# Patient Record
Sex: FEMALE | Race: WHITE | HISPANIC OR LATINO | Employment: FULL TIME | URBAN - METROPOLITAN AREA
[De-identification: names, ages, dates, MRNs, and addresses within clinical notes are randomized per-mention and may not be internally consistent; named-entity substitution may affect disease eponyms.]

---

## 2017-01-04 ENCOUNTER — ALLSCRIPTS OFFICE VISIT (OUTPATIENT)
Dept: OTHER | Facility: OTHER | Age: 32
End: 2017-01-04

## 2017-01-23 ENCOUNTER — GENERIC CONVERSION - ENCOUNTER (OUTPATIENT)
Dept: OTHER | Facility: OTHER | Age: 32
End: 2017-01-23

## 2017-01-23 ENCOUNTER — APPOINTMENT (OUTPATIENT)
Dept: LAB | Facility: HOSPITAL | Age: 32
End: 2017-01-23
Attending: OBSTETRICS & GYNECOLOGY
Payer: COMMERCIAL

## 2017-01-23 DIAGNOSIS — N91.2 AMENORRHEA: ICD-10-CM

## 2017-01-23 LAB — B-HCG SERPL-ACNC: <2 MIU/ML

## 2017-01-23 PROCEDURE — 84702 CHORIONIC GONADOTROPIN TEST: CPT

## 2017-01-23 PROCEDURE — 36415 COLL VENOUS BLD VENIPUNCTURE: CPT

## 2017-01-24 ENCOUNTER — GENERIC CONVERSION - ENCOUNTER (OUTPATIENT)
Dept: OTHER | Facility: OTHER | Age: 32
End: 2017-01-24

## 2017-05-25 ENCOUNTER — ALLSCRIPTS OFFICE VISIT (OUTPATIENT)
Dept: OTHER | Facility: OTHER | Age: 32
End: 2017-05-25

## 2017-09-27 ENCOUNTER — ALLSCRIPTS OFFICE VISIT (OUTPATIENT)
Dept: OTHER | Facility: OTHER | Age: 32
End: 2017-09-27

## 2017-11-29 ENCOUNTER — ALLSCRIPTS OFFICE VISIT (OUTPATIENT)
Dept: OTHER | Facility: OTHER | Age: 32
End: 2017-11-29

## 2017-11-29 DIAGNOSIS — K58.9 IRRITABLE BOWEL SYNDROME WITHOUT DIARRHEA: ICD-10-CM

## 2017-11-29 DIAGNOSIS — Z13.6 ENCOUNTER FOR SCREENING FOR CARDIOVASCULAR DISORDERS: ICD-10-CM

## 2017-11-29 DIAGNOSIS — F41.9 ANXIETY DISORDER: ICD-10-CM

## 2017-12-01 ENCOUNTER — APPOINTMENT (OUTPATIENT)
Dept: LAB | Age: 32
End: 2017-12-01
Payer: COMMERCIAL

## 2017-12-01 ENCOUNTER — TRANSCRIBE ORDERS (OUTPATIENT)
Dept: ADMINISTRATIVE | Age: 32
End: 2017-12-01

## 2017-12-01 DIAGNOSIS — K58.9 IRRITABLE BOWEL SYNDROME WITHOUT DIARRHEA: ICD-10-CM

## 2017-12-01 DIAGNOSIS — Z13.6 ENCOUNTER FOR SCREENING FOR CARDIOVASCULAR DISORDERS: ICD-10-CM

## 2017-12-01 DIAGNOSIS — F41.9 ANXIETY DISORDER: ICD-10-CM

## 2017-12-01 LAB
ALBUMIN SERPL BCP-MCNC: 3.6 G/DL (ref 3.5–5)
ALP SERPL-CCNC: 42 U/L (ref 46–116)
ALT SERPL W P-5'-P-CCNC: 16 U/L (ref 12–78)
ANION GAP SERPL CALCULATED.3IONS-SCNC: 4 MMOL/L (ref 4–13)
AST SERPL W P-5'-P-CCNC: 10 U/L (ref 5–45)
BASOPHILS # BLD AUTO: 0.04 THOUSANDS/ΜL (ref 0–0.1)
BASOPHILS NFR BLD AUTO: 1 % (ref 0–1)
BILIRUB SERPL-MCNC: 0.83 MG/DL (ref 0.2–1)
BUN SERPL-MCNC: 11 MG/DL (ref 5–25)
CALCIUM SERPL-MCNC: 9.2 MG/DL (ref 8.3–10.1)
CHLORIDE SERPL-SCNC: 105 MMOL/L (ref 100–108)
CHOLEST SERPL-MCNC: 162 MG/DL (ref 50–200)
CO2 SERPL-SCNC: 28 MMOL/L (ref 21–32)
CREAT SERPL-MCNC: 0.66 MG/DL (ref 0.6–1.3)
EOSINOPHIL # BLD AUTO: 0.05 THOUSAND/ΜL (ref 0–0.61)
EOSINOPHIL NFR BLD AUTO: 1 % (ref 0–6)
ERYTHROCYTE [DISTWIDTH] IN BLOOD BY AUTOMATED COUNT: 12.5 % (ref 11.6–15.1)
GFR SERPL CREATININE-BSD FRML MDRD: 119 ML/MIN/1.73SQ M
GLUCOSE P FAST SERPL-MCNC: 87 MG/DL (ref 65–99)
HCT VFR BLD AUTO: 37.2 % (ref 34.8–46.1)
HDLC SERPL-MCNC: 84 MG/DL (ref 40–60)
HGB BLD-MCNC: 11.9 G/DL (ref 11.5–15.4)
LDLC SERPL CALC-MCNC: 55 MG/DL (ref 0–100)
LYMPHOCYTES # BLD AUTO: 1.55 THOUSANDS/ΜL (ref 0.6–4.47)
LYMPHOCYTES NFR BLD AUTO: 32 % (ref 14–44)
MCH RBC QN AUTO: 28.8 PG (ref 26.8–34.3)
MCHC RBC AUTO-ENTMCNC: 32 G/DL (ref 31.4–37.4)
MCV RBC AUTO: 90 FL (ref 82–98)
MONOCYTES # BLD AUTO: 0.43 THOUSAND/ΜL (ref 0.17–1.22)
MONOCYTES NFR BLD AUTO: 9 % (ref 4–12)
NEUTROPHILS # BLD AUTO: 2.82 THOUSANDS/ΜL (ref 1.85–7.62)
NEUTS SEG NFR BLD AUTO: 57 % (ref 43–75)
NRBC BLD AUTO-RTO: 0 /100 WBCS
PLATELET # BLD AUTO: 232 THOUSANDS/UL (ref 149–390)
PMV BLD AUTO: 10.7 FL (ref 8.9–12.7)
POTASSIUM SERPL-SCNC: 4.6 MMOL/L (ref 3.5–5.3)
PROT SERPL-MCNC: 7 G/DL (ref 6.4–8.2)
RBC # BLD AUTO: 4.13 MILLION/UL (ref 3.81–5.12)
SODIUM SERPL-SCNC: 137 MMOL/L (ref 136–145)
TRIGL SERPL-MCNC: 114 MG/DL
TSH SERPL DL<=0.05 MIU/L-ACNC: 1.76 UIU/ML (ref 0.36–3.74)
WBC # BLD AUTO: 4.91 THOUSAND/UL (ref 4.31–10.16)

## 2017-12-01 PROCEDURE — 85025 COMPLETE CBC W/AUTO DIFF WBC: CPT

## 2017-12-01 PROCEDURE — 36415 COLL VENOUS BLD VENIPUNCTURE: CPT

## 2017-12-01 PROCEDURE — 84443 ASSAY THYROID STIM HORMONE: CPT

## 2017-12-01 PROCEDURE — 80053 COMPREHEN METABOLIC PANEL: CPT

## 2017-12-01 PROCEDURE — 80061 LIPID PANEL: CPT

## 2017-12-02 ENCOUNTER — GENERIC CONVERSION - ENCOUNTER (OUTPATIENT)
Dept: OTHER | Facility: OTHER | Age: 32
End: 2017-12-02

## 2018-01-11 NOTE — PSYCH
Psych Med Mgmt    Appearance: was calm and cooperative, adequate hygiene and grooming and good eye contact  Observed mood: depressed and anxious  Observed mood: affect appropriate  Speech: a normal rate and fluent  Thought processes: coherent/organized  Hallucinations: no hallucinations present  Thought Content: no delusions  Abnormal Thoughts: The patient has no suicidal thoughts and no homicidal thoughts  Orientation: The patient is oriented to person, place and time, oriented to person, oriented to place and oriented to time  Recent and Remote Memory: short term memory intact and long term memory intact  Attention Span And Concentration: concentration intact  Insight: Limited insight  Judgment: Her judgment was limited  Muscle Strength And Tone  Muscle strength and tone were normal    The patient is experiencing no localized pain  Goals addressed in session: Medication Management       Treatment Recommendations: Continue Citalopram 20 mg and Clonazepam    Risks, Benefits And Possible Side Effects Of Medications: Risks, benefits, and possible side effects of medications explained to patient and patient verbalizes understanding  She reports normal appetite, normal energy level, no weight change and normal number of sleep hours  Mood has been stable  She did well coming down from 40 mg Citalopram to 20 mg  No new symptoms  No recent health changes   No new medications  Assessment    1  MDD (major depressive disorder), recurrent severe, without psychosis (296 33) (F33 2)   2  SHRAA (generalized anxiety disorder) (300 02) (F41 1)    Plan    1  ClonazePAM 1 MG Oral Tablet; TAKE 1 TABLET AT BEDTIME    2  Citalopram Hydrobromide 20 MG Oral Tablet; TAKE 1 TABLET DAILY    Review of Systems    Constitutional: as noted in HPI  Substance Abuse Hx    Substance Abuse History: Denies tobacco use  Occasional alcohol  Active Problems    1  Anxiety (300 00) (F41 9)   2  Depression (311) (F32 9)   3  Dysmenorrhea (625 3) (N94 6)   4  Encounter for routine gynecological examination (V72 31) (Z01 419)   5  GERD without esophagitis (530 81) (K21 9)   6  History of self breast exam   7  Migraine (346 90) (G43 909)    Past Medical History    1  History of Acute sinusitis (461 9) (J01 90)   2  Acute upper respiratory infection (465 9) (J06 9)   3  History of Cervical cancer screening (V76 2) (Z12 4)   4  History of  0 (V49 89) (Z78 9)   5  History of acute bronchitis (V12 69) (Z87 09)   6  History of acute pharyngitis (V12 69) (Z87 09)   7  History of acute pharyngitis (V12 69) (Z87 09)   8  History of Impacted cerumen of both ears (380 4) (H61 23)   9  History of Never Pregnant   10  History of Vaginal candidiasis (112 1) (B37 3)    The active problems and past medical history were reviewed and updated today  Allergies    1  No Known Drug Allergies   2  No Known Drug Allergies    Current Meds   1  Citalopram Hydrobromide 20 MG Oral Tablet; TAKE 1 TABLET DAILY; Therapy: 47TAD8320 to (Jared Patrick)  Requested for: 59CLN1406; Last   Rx:43Pqj4179 Ordered   2  ClonazePAM 1 MG Oral Tablet; TAKE 1 TABLET AT BEDTIME; Therapy: 02XXG2771 to (Evaluate:2016); Last Rx:34Phz7737 Ordered   3  Naproxen Sodium 550 MG Oral Tablet; TAKE 1 TABLET EVERY 12 HOURS as needed; Therapy: 13EOA3577 to (Evaluate:95Ene4220)  Requested for: 90DZH7038; Last   Rx:29Zqh0985 Ordered   4  Rizatriptan Benzoate 10 MG Oral Tablet Dispersible; TAKE 1 TABLET AT ONSET OF   HEADACHE  MAY REPEAT EVERY 2 HOURS AS NEEDED  MAXIMUM 3 TABLETS IN 24   HOURS; Therapy: 20RYA7358 to (Last Rx:2016)  Requested for: 35FQZ2482 Ordered    The medication list was reviewed and updated today  Family Psych History  Mother    1  Family history of Hypothyroidism  Maternal Grandmother    2  Family history of Thyroid Disorder (V18 19)  Paternal Grandmother    3  Family history of Colon Cancer (V16 0)   4  Family history of Thyroid Disorder (V18 19)  Paternal Grandfather    5  Family history of Colon Cancer (V16 0)   6  Family history of Thyroid Disorder (V18 19)  Maternal Aunt    7  Family history of Thyroid Disorder (V18 19)   8  Family history of Thyroid Disorder (V18 19)  Paternal Aunt    5  Family history of Thyroid Disorder (V18 19)   10  Family history of Thyroid Disorder (V18 19)  Maternal Uncle    11  Family history of Thyroid Disorder (V18 19)   12  Family history of Thyroid Disorder (V18 19)  Paternal Uncle    15  Family history of Colon Cancer (V16 0)   14  Family history of Crohn's Disease   15  Family history of Thyroid Disorder (V18 19)   16  Family history of Thyroid Disorder (V18 19)  Family History    17  Family history of Benign Polyps Of The Large Intestine (V18 51)    The family history was reviewed and updated today  Social History    · Being A Social Drinker   · Birth Control Not Practiced   · Caffeine Use   · Daily Coffee Consumption (1  Cups/Day)   · Denied: History of Drug Use   · Former smoker (V15 82) (I18 169)   · Denied: History of    · Marital History - Single   · Yarsanism Affiliation None   · Uses Safety Equipment - Seatbelts  The social history was reviewed and updated today  The social history was reviewed and is unchanged  End of Encounter Meds    1  ClonazePAM 1 MG Oral Tablet; TAKE 1 TABLET AT BEDTIME; Therapy: 02LTH6181 to (Evaluate:69Kzd7875); Last SL:14BRY5416 Ordered    2  Citalopram Hydrobromide 20 MG Oral Tablet; TAKE 1 TABLET DAILY; Therapy: 96ANF3643 to (Edison Rideau)  Requested for: 29EUL2497; Last   Rx:28Xdv1055 Ordered    3  Naproxen Sodium 550 MG Oral Tablet (Anaprox DS); TAKE 1 TABLET EVERY 12 HOURS   as needed; Therapy: 30SGJ8429 to (Evaluate:14Hbr9064)  Requested for: 92UFX7415; Last   Rx:86Ifq2712 Ordered    4  Rizatriptan Benzoate 10 MG Oral Tablet Dispersible; TAKE 1 TABLET AT ONSET OF   HEADACHE  MAY REPEAT EVERY 2 HOURS AS NEEDED  MAXIMUM 3 TABLETS IN 24   HOURS;    Therapy: 85GFA3673 to (Last Rx:08Mar2016)  Requested for: 12WFS6326 Ordered    Signatures   Electronically signed by : ELIEZER Toledo ; Jan 4 2017  9:45AM EST                       (Author)

## 2018-01-11 NOTE — RESULT NOTES
Verified Results  (1) HEMOGLOBIN A1C 63MME4199 01:42PM Josiah Nation   5 7-6 4% impaired fasting glucose  >=6 5% diagnosis of diabetes    Falsely low levels are seen in conditions linked to short RBC life span-  hemolytic anemia, and splenomegaly  Falsely elevated levels are seen in situations where there is an increased production of RBC- receipt of erythropoietin or blood transfusions  Adopted from ADA-Clinical Practice Recommendations     Test Name Result Flag Reference   HEMOGLOBIN A1C 4 9 %  4 0-5 6   EST  AVG  GLUCOSE 94 mg/dl       (1) COMPREHENSIVE METABOLIC PANEL 44VUF5289 64:02CG Josiah Nation   National Kidney Disease Education Program recommendations are as follows:  GFR calculation is accurate only with a steady state creatinine  Chronic Kidney disease less than 60 ml/min/1 73 sq  meters  Kidney failure less than 15 ml/min/1 73 sq  meters  Test Name Result Flag Reference   GLUCOSE,RANDM 87 mg/dL     If the patient is fasting, the ADA then defines impaired fasting glucose as > 100 mg/dL and diabetes as > or equal to 123 mg/dL     SODIUM 140 mmol/L  136-145   POTASSIUM 4 2 mmol/L  3 5-5 3   CHLORIDE 105 mmol/L  100-108   CARBON DIOXIDE 28 mmol/L  21-32   ANION GAP (CALC) 7 mmol/L  4-13   BLOOD UREA NITROGEN 12 mg/dL  5-25   CREATININE 0 66 mg/dL  0 60-1 30   Standardized to IDMS reference method   CALCIUM 8 7 mg/dL  8 3-10 1   BILI, TOTAL 0 41 mg/dL  0 20-1 00   ALK PHOSPHATAS 47 U/L     ALT (SGPT) 25 U/L  12-78   AST(SGOT) 15 U/L  5-45   ALBUMIN 3 8 g/dL  3 5-5 0   TOTAL PROTEIN 7 2 g/dL  6 4-8 2   eGFR Non-African American > ml/min/1 73sq m       (1) LIPID PANEL, FASTING 88TFX1054 09:19AM Josiah Nation   Triglyceride:         Normal              <150 mg/dl       Borderline High    150-199 mg/dl       High               200-499 mg/dl       Very High          >499 mg/dl  Cholesterol:         Desirable        <200 mg/dl      Borderline High  200-239 mg/dl      High             >239 mg/dl  HDL Cholesterol:        High    >59 mg/dL      Low     <41 mg/dL  LDL CALCULATED:    This screening LDL is a calculated result  It does not have the accuracy of the Direct Measured LDL in the monitoring of patients with hyperlipidemia and/or statin therapy  Direct Measure LDL (PKR921) must be ordered separately in these patients  Test Name Result Flag Reference   CHOLESTEROL 178 mg/dL     HDL,DIRECT 84 mg/dL H 40-60   LDL CHOLESTEROL CALCULATED 87 mg/dL  0-100   TRIGLYCERIDES 35 mg/dL  <=150     (1) TSH 51EJK9413 09:19AM [facility] [place]   Patients undergoing fluorescein dye angiography may retain small amounts of fluorescein in the body for 48-72 hours post procedure  Samples containing fluorescein can produce falsely depressed TSH values  If the patient had this procedure,a specimen should be resubmitted post fluorescein clearance  The recommended reference ranges for TSH during pregnancy are as follows:  First trimester 0 1 to 2 5 uIU/mL  Second trimester  0 2 to 3 0 uIU/mL  Third trimester 0 3 to 3 0 uIU/m     Test Name Result Flag Reference   TSH 1 368 uIU/mL  0 358-3 740     (1) CBC/PLT/DIFF 26SQQ2747 08:18AM [facility] [place]     Test Name Result Flag Reference   WBC COUNT 4 26 Thousand/uL L 4 31-10 16   RBC COUNT 4 35 Million/uL  3 81-5 12   HEMOGLOBIN 12 3 g/dL  11 5-15 4   HEMATOCRIT 38 1 %  34 8-46  1   MCV 87 6 fL  82 0-98 0   MCH 28 3 pg  26 8-34 3   MCHC 32 3 g/dL  31 4-37 4   RDW 12 7 %  11 6-15 1   MPV 10 6 fL  8 9-12 7   PLATELET COUNT 130 Thousands/uL  149-390   nRBC AUTOMATED 0 /100 WBCs     NEUTROPHILS RELATIVE PERCENT 56 %  43-75   LYMPHOCYTES RELATIVE PERCENT 33 %  14-44   MONOCYTES RELATIVE PERCENT 9 %  4-12   EOSINOPHILS RELATIVE PERCENT 1 %  0-6   BASOPHILS RELATIVE PERCENT 1 %  0-1   NEUTROPHILS ABSOLUTE COUNT 2 41 Thousands/µL  1 85-7 62   LYMPHOCYTES ABSOLUTE COUNT 1 39 Thousands/µL  0 60-4 47   MONOCYTES ABSOLUTE COUNT 0 40 Thousand/µL  0 17-1 22   EOSINOPHILS ABSOLUTE COUNT 0 04 Thousand/µL  0 00-0 61   BASOPHILS ABSOLUTE COUNT 0 02 Thousands/µL  0 00-0 10       Discussion/Summary   Lab work is really good  Outstanding cholesterol profile, she has a high HDL and low LDL which is very good  Sugars, kidneys, liver, thyroid, blood count are all normal  Recheck her lab work in 3-5 years

## 2018-01-12 NOTE — MISCELLANEOUS
Message   Date: 23 Jan 2017 11:58 AM EST, Recorded By: Danish Hua For: Doreen Leal: ARVIND Juarez 69 L, Self   Phone: [phone] (Home), [phone] (Work)   Reason: Medical Complaint   pts cycle is 6 days late and she did HPT which were negative    ordered a HCG    pt will have it drawn today           Active Problems    1  Amenorrhea (626 0) (N91 2)   2  Anxiety (300 00) (F41 9)   3  Depression (311) (F32 9)   4  Dysmenorrhea (625 3) (N94 6)   5  Encounter for routine gynecological examination (V72 31) (Z01 419)   6  SHARA (generalized anxiety disorder) (300 02) (F41 1)   7  GERD without esophagitis (530 81) (K21 9)   8  History of self breast exam   9  MDD (major depressive disorder), recurrent severe, without psychosis (296 33) (F33 2)   10  Migraine (346 90) (G43 909)    Current Meds   1  Citalopram Hydrobromide 20 MG Oral Tablet; TAKE 1 TABLET DAILY; Therapy: 61MMD2120 to (Adam Proper)  Requested for: 07YKL4445; Last   Rx:67Vyz1758 Ordered   2  ClonazePAM 1 MG Oral Tablet; TAKE 1 TABLET AT BEDTIME; Therapy: 41ADQ1728 to (Evaluate:16Pyq6982); Last CM:49SLA4334 Ordered   3  Naproxen Sodium 550 MG Oral Tablet (Anaprox DS); TAKE 1 TABLET EVERY 12   HOURS as needed; Therapy: 02DXM8793 to (Evaluate:83Suz1373)  Requested for: 74CSK0000; Last   Rx:23Egt9839 Ordered   4  Rizatriptan Benzoate 10 MG Oral Tablet Dispersible; TAKE 1 TABLET AT ONSET OF   HEADACHE  MAY REPEAT EVERY 2 HOURS AS NEEDED  MAXIMUM 3 TABLETS IN 24   HOURS; Therapy: 34WZF4620 to (Last Rx:08Mar2016)  Requested for: 31GVY8135 Ordered    Allergies    1  No Known Drug Allergies   2  No Known Drug Allergies    Plan  Amenorrhea    · (1) HCG QUANT; Status:Active - Retrospective Authorization;  Requested DEVON:02HSS8142;     Signatures   Electronically signed by : Donta Dale, ; Jan 23 2017 11:58AM EST                       (Author)

## 2018-01-13 VITALS
WEIGHT: 143.25 LBS | BODY MASS INDEX: 24.46 KG/M2 | SYSTOLIC BLOOD PRESSURE: 110 MMHG | DIASTOLIC BLOOD PRESSURE: 58 MMHG | HEIGHT: 64 IN

## 2018-01-13 NOTE — PROGRESS NOTES
Assessment    1  Encounter for preventive health examination (V70 0) (Z00 00)   2  Irritable bowel syndrome (564 1) (K58 9)   3  Screening for cardiovascular condition (V81 2) (Z13 6)    Plan  Anxiety, Irritable bowel syndrome    · (1) TSH; Status:Active; Requested for:29Nov2017;   Irritable bowel syndrome    · Hyoscyamine Sulfate 0 125 MG Sublingual Tablet Sublingual; PLACE 1 TABLET  UNDER THE TONGUE  3 TIMES DAILY AS NEEDED   · (1) CBC/PLT/DIFF; Status:Active; Requested for:29Nov2017;    · (1) COMPREHENSIVE METABOLIC PANEL; Status:Active; Requested for:29Nov2017;   Screening for cardiovascular condition    · (1) LIPID PANEL, FASTING; Status:Active; Requested for:29Nov2017;     Chief Complaint  Here for "work physical"-has form      History of Present Illness  HM, Adult Female: The patient is being seen for a health maintenance evaluation  The last health maintenance visit was One year(s) ago  General Health: The patient's health since the last visit is described as good  She has regular dental visits  She denies vision problems  She denies hearing loss  Immunizations status:  Declines flu shot  Lifestyle:  She consumes a diverse and healthy diet  She does not have any weight concerns  She exercises regularly  She does not use tobacco  She denies alcohol use  She denies drug use  Reproductive health: the patient is premenopausal   she reports normal menses  pregnancy history: G 0  Screening: cancer screening reviewed and current  metabolic screening reviewed and updated  risk screening reviewed and current  Review of Systems    Constitutional: No fever, no chills, feels well, no tiredness, no recent weight gain or weight loss  Eyes: No complaints of eye pain, no red eyes, no eyesight problems, no discharge, no dry eyes, no itching of eyes  ENT: no complaints of earache, no loss of hearing, no nose bleeds, no nasal discharge, no sore throat, no hoarseness     Cardiovascular: No complaints of slow heart rate, no fast heart rate, no chest pain, no palpitations, no leg claudication, no lower extremity edema  Respiratory: No complaints of shortness of breath, no wheezing, no cough, no SOB on exertion, no orthopnea, no PND  Gastrointestinal: abdominal pain, constipation, diarrhea and Intermittent abdominal crampy pain with bouts of diarrhea and constipation alternating  Has known irritable bowel disease  Really cannot identify any triggering foods  Genitourinary: No complaints of dysuria, no incontinence, no pelvic pain, no dysmenorrhea, no vaginal discharge or bleeding  Musculoskeletal: No complaints of arthralgias, no myalgias, no joint swelling or stiffness, no limb pain or swelling  Integumentary: No complaints of skin rash or lesions, no itching, no skin wounds, no breast pain or lump  Neurological: No complaints of headache, no confusion, no convulsions, no numbness, no dizziness or fainting, no tingling, no limb weakness, no difficulty walking  Psychiatric: anxiety, depression and Continues to see Psychiatry  They are currently tapering her SSRI, but Not suicidal, no sleep disturbance, no anxiety or depression, no change in personality, no emotional problems  Endocrine: No complaints of proptosis, no hot flashes, no muscle weakness, no deepening of the voice, no feelings of weakness  Hematologic/Lymphatic: No complaints of swollen glands, no swollen glands in the neck, does not bleed easily, does not bruise easily  Active Problems    1  Amenorrhea (626 0) (N91 2)   2  Anxiety (300 00) (F41 9)   3  Depression (311) (F32 9)   4  Dysmenorrhea (625 3) (N94 6)   5  Encounter for routine gynecological examination (V72 31) (Z01 419)   6  SHARA (generalized anxiety disorder) (300 02) (F41 1)   7  Gastritis (535 50) (K29 70)   8  GERD without esophagitis (530 81) (K21 9)   9  History of self breast exam   10   MDD (major depressive disorder), recurrent severe, without psychosis (296 33) (F33 2)   11   Migraine (346 90) (G43 909)    Past Medical History    · History of Acute sinusitis (461 9) (J01 90)   · Acute upper respiratory infection (465 9) (J06 9)   · History of Cervical cancer screening (V76 2) (Z12 4)   · History of  0 (V49 89)   · History of acute bronchitis (V12 69) (Z87 09)   · History of acute pharyngitis (V12 69) (Z87 09)   · History of acute pharyngitis (V12 69) (Z87 09)   · History of self breast exam   · History of Impacted cerumen of both ears (380 4) (H61 23)   · History of Never Pregnant   · History of Vaginal candidiasis (112 1) (B37 3)    Surgical History    · History of Biopsy Skin   · History of Complete Colonoscopy   · History of Diagnostic Esophagogastroduodenoscopy   · History of Oral Surgery Tooth Extraction    Family History  Mother    · Family history of Hypothyroidism  Maternal Grandmother    · Family history of Thyroid Disorder (V18 19)  Paternal Grandmother    · Family history of Colon Cancer (V16 0)   · Family history of Thyroid Disorder (V18 19)  Paternal Grandfather    · Family history of Colon Cancer (V16 0)   · Family history of Thyroid Disorder (V18 19)  Maternal Aunt    · Family history of Thyroid Disorder (V18 19)   · Family history of Thyroid Disorder (V18 19)  Paternal Aunt    · Family history of Thyroid Disorder (V18 19)   · Family history of Thyroid Disorder (V18 19)  Maternal Uncle    · Family history of Thyroid Disorder (V18 19)   · Family history of Thyroid Disorder (V18 19)  Paternal Uncle    · Family history of Colon Cancer (V16 0)   · Family history of Crohn's Disease   · Family history of Thyroid Disorder (V18 19)   · Family history of Thyroid Disorder (V18 19)  Family History    · Family history of Benign Polyps Of The Large Intestine (V18 51)    Social History    · Being A Social Drinker   · Birth Control Not Practiced   · Caffeine Use   · Daily Coffee Consumption (1  Cups/Day)   · Denied: History of Drug Use   · Former smoker (V15 82) (U54 316)   · QUIT 2011   · Denied: History of    · Marital History - Single   · Mosque Affiliation None   · Uses Safety Equipment - Seatbelts    Current Meds   1  Citalopram Hydrobromide 20 MG Oral Tablet; TAKE 1 TABLET DAILY; Therapy: 51LME7923 to (Evaluate:2018)  Requested for: 72AJJ4491; Last   Rx:2017 Ordered   2  ClonazePAM 1 MG Oral Tablet; TAKE 1 TABLET AT BEDTIME; Therapy: 73IJB2244 to (Evaluate:2017); Last Rx:2017 Ordered   3  RaNITidine HCl - 300 MG Oral Tablet; Take 1 tablet by mouth at bedtime; Therapy: 31QON0182 to (WFYIFRHE:71ZRO3653)  Requested for: 22YWM2275; Last   Rx:88Mrc5653 Ordered   4  Rizatriptan Benzoate 10 MG Oral Tablet Disintegrating; TAKE 1 TABLET AT ONSET OF   HEADACHE  MAY REPEAT EVERY 2 HOURS AS NEEDED  MAXIMUM 3 TABLETS IN 24   HOURS; Therapy: 72XGV4266 to (Last Rx:2016)  Requested for: 75XGB5105 Ordered    Allergies    1  No Known Drug Allergies   2  No Known Drug Allergies    Vitals   Recorded: 52RLV0551 09:22AM   Temperature 06 2 F   Systolic 90, LUE, Sitting   Diastolic 62, LUE, Sitting   BP CUFF SIZE Large   Height 5 ft 4 in   Weight 138 lb 8 oz   BMI Calculated 23 77   BSA Calculated 1 67     Physical Exam    Constitutional   General appearance: No acute distress, well appearing and well nourished  Eyes   Conjunctiva and lids: No swelling, erythema or discharge  Pupils and irises: Equal, round and reactive to light  Ears, Nose, Mouth, and Throat   External inspection of ears and nose: Normal     Otoscopic examination: Tympanic membranes translucent with normal light reflex  Canals patent without erythema  Oropharynx: Normal with no erythema, edema, exudate or lesions  Pulmonary   Respiratory effort: No increased work of breathing or signs of respiratory distress  Auscultation of lungs: Clear to auscultation  Cardiovascular   Palpation of heart: Normal PMI, no thrills      Auscultation of heart: Normal rate and rhythm, normal S1 and S2, without murmurs  Examination of extremities for edema and/or varicosities: Normal     Abdomen   Abdomen: Abnormal   The abdomen was flat  Bowel sounds were normal  There was mild tenderness that was diffuse  The abdomen was not firm and not rigid  No rebound tenderness  No guarding  no masses palpated  The abdomen was normal to percussion  Liver and spleen: No hepatomegaly or splenomegaly  Lymphatic   Palpation of lymph nodes in neck: No lymphadenopathy  Musculoskeletal   Gait and station: Normal     Digits and nails: Normal without clubbing or cyanosis  Inspection/palpation of joints, bones, and muscles: Normal     Skin   Skin and subcutaneous tissue: Normal without rashes or lesions  Neurologic   Cranial nerves: Cranial nerves 2-12 intact  Reflexes: 2+ and symmetric  Sensation: No sensory loss      Psychiatric   Orientation to person, place, and time: Normal     Mood and affect: Normal        Signatures   Electronically signed by : Marcos Canavan, DO; Nov 29 2017  9:43AM EST                       (Author)

## 2018-01-14 VITALS
BODY MASS INDEX: 23.9 KG/M2 | WEIGHT: 140 LBS | DIASTOLIC BLOOD PRESSURE: 72 MMHG | SYSTOLIC BLOOD PRESSURE: 120 MMHG | HEIGHT: 64 IN

## 2018-01-15 VITALS
SYSTOLIC BLOOD PRESSURE: 90 MMHG | TEMPERATURE: 96.8 F | WEIGHT: 138.5 LBS | DIASTOLIC BLOOD PRESSURE: 62 MMHG | HEIGHT: 64 IN | BODY MASS INDEX: 23.64 KG/M2

## 2018-01-18 NOTE — PSYCH
Psych Med Mgmt    Appearance: was calm and cooperative and adequate hygiene and grooming  Observed mood: mood appropriate  Observed mood: affect appropriate  Speech: a normal rate  Thought processes: coherent/organized  Hallucinations: no hallucinations present  Thought Content: no delusions  Abnormal Thoughts: The patient has no suicidal thoughts and no homicidal thoughts  Orientation: The patient is oriented to person, place and time, oriented to person, oriented to place and oriented to time  Recent and Remote Memory: short term memory intact and long term memory intact  Attention Span And Concentration: concentration intact  Insight: Limited insight  Judgment: Her judgment was limited  Muscle Strength And Tone  Muscle strength and tone were normal    The patient is experiencing no localized pain  Goals addressed in session: Medication Management       Treatment Recommendations: Will decrease dose of Celexa to eventually d/c  continue clonazepam use prn  Risks, Benefits And Possible Side Effects Of Medications: Risks, benefits, and possible side effects of medications explained to patient and patient verbalizes understanding  She reports normal appetite, normal energy level, no weight change and normal number of sleep hours  Mood had been stable for the past year   She will like to be [place] off medication   No health changes no new medications  Vitals  Signs   Recorded: 08Yrq3198 09:43AM   Height: 5 ft 4 5 in  Weight: 143 lb   BMI Calculated: 24 17  BSA Calculated: 1 71    Assessment    1  Anxiety (300 00) (F41 9)   2  Depression (311) (F32 9)    Plan    1  ClonazePAM 1 MG Oral Tablet; TAKE 1 TABLET AT BEDTIME    2  From  Citalopram Hydrobromide 40 MG Oral Tablet TAKE 1 TABLET DAILY To   Citalopram Hydrobromide 20 MG Oral Tablet (CeleXA) TAKE 1 TABLET DAILY    Review of Systems    Constitutional: as noted in HPI        Substance Abuse Hx    Substance Abuse History: Denies  Active Problems    1  Anxiety (300 00) (F41 9)   2  Depression (311) (F32 9)   3  Dysmenorrhea (625 3) (N94 6)   4  Encounter for routine gynecological examination (V72 31) (Z01 419)   5  GERD without esophagitis (530 81) (K21 9)   6  History of self breast exam   7  Migraine (346 90) (G43 909)    Past Medical History    1  History of Acute sinusitis (461 9) (J01 90)   2  Acute upper respiratory infection (465 9) (J06 9)   3  History of Cervical cancer screening (V76 2) (Z12 4)   4  History of  0 (V49 89) (Z78 9)   5  History of acute bronchitis (V12 69) (Z87 09)   6  History of acute pharyngitis (V12 69) (Z87 09)   7  History of acute pharyngitis (V12 69) (Z87 09)   8  History of Impacted cerumen of both ears (380 4) (H61 23)   9  History of Never Pregnant   10  History of Vaginal candidiasis (112 1) (B37 3)    The active problems and past medical history were reviewed and updated today  Allergies    1  No Known Drug Allergies   2  No Known Drug Allergies    Current Meds   1  Citalopram Hydrobromide 40 MG Oral Tablet (CeleXA); TAKE 1 TABLET DAILY; Therapy: 05MQQ0903 to (Randee Mack)  Requested for: 57Ewr7546; Last   Rx:29Tfu9575 Ordered   2  ClonazePAM 1 MG Oral Tablet; TAKE 1 TABLET AT BEDTIME; Therapy: 39OIZ0517 to (Evaluate:32Npi6639); Last EP:92DUG3583 Ordered   3  Naproxen Sodium 550 MG Oral Tablet (Anaprox DS); TAKE 1 TABLET EVERY 12 HOURS   as needed; Therapy: 62UNP7099 to (Evaluate:74Ggs8530)  Requested for: 88XOP3335; Last   Rx:16Kig5528 Ordered   4  Rizatriptan Benzoate 10 MG Oral Tablet Dispersible; TAKE 1 TABLET AT ONSET OF   HEADACHE  MAY REPEAT EVERY 2 HOURS AS NEEDED  MAXIMUM 3 TABLETS IN 24   HOURS; Therapy: 33URR9930 to (Last Rx:13Fbf2776)  Requested for: 27SCY1846 Ordered    The medication list was reviewed and updated today  Family Psych History  Mother    1  Family history of Hypothyroidism  Maternal Grandmother    2   Family history of Thyroid Disorder (V18 19)  Paternal Grandmother    3  Family history of Colon Cancer (V16 0)   4  Family history of Thyroid Disorder (V18 19)  Paternal Grandfather    5  Family history of Colon Cancer (V16 0)   6  Family history of Thyroid Disorder (V18 19)  Maternal Aunt    7  Family history of Thyroid Disorder (V18 19)   8  Family history of Thyroid Disorder (V18 19)  Paternal Aunt    5  Family history of Thyroid Disorder (V18 19)   10  Family history of Thyroid Disorder (V18 19)  Maternal Uncle    11  Family history of Thyroid Disorder (V18 19)   12  Family history of Thyroid Disorder (V18 19)  Paternal Uncle    15  Family history of Colon Cancer (V16 0)   14  Family history of Crohn's Disease   15  Family history of Thyroid Disorder (V18 19)   16  Family history of Thyroid Disorder (V18 19)  Family History    17  Family history of Benign Polyps Of The Large Intestine (V18 51)    The family history was reviewed and updated today  Social History    · Being A Social Drinker   · Birth Control Not Practiced   · Caffeine Use   · Daily Coffee Consumption (1  Cups/Day)   · Denied: History of Drug Use   · Former smoker (V15 82) (G97 237)   · Denied: History of    · Marital History - Single   · Sikh Affiliation None   · Uses Safety Equipment - Seatbelts  The social history was reviewed and updated today  The social history was reviewed and is unchanged  End of Encounter Meds    1  ClonazePAM 1 MG Oral Tablet; TAKE 1 TABLET AT BEDTIME; Therapy: 83JYY9987 to (Evaluate:2016); Last Rx:51Yro4000 Ordered    2  Citalopram Hydrobromide 20 MG Oral Tablet (CeleXA); TAKE 1 TABLET DAILY; Therapy: 08XUJ5237 to (Yas Hooker)  Requested for: 12Gee0486; Last   Rx:02Wbg8498 Ordered    3  Naproxen Sodium 550 MG Oral Tablet (Anaprox DS); TAKE 1 TABLET EVERY 12 HOURS   as needed; Therapy: 05YIE5934 to (Evaluate:31Sdj7149)  Requested for: 06YFI6724; Last   Rx:77Iez7706 Ordered    4   Rizatriptan Benzoate 10 MG Oral Tablet Dispersible; TAKE 1 TABLET AT ONSET OF   HEADACHE  MAY REPEAT EVERY 2 HOURS AS NEEDED  MAXIMUM 3 TABLETS IN 24   HOURS;    Therapy: 17YYT4665 to (Last Rx:08Mar2016)  Requested for: 86HSI3618 Ordered    Signatures   Electronically signed by : ELIEZER Desouza ; Aug 26 2016  9:43AM EST                       (Author)

## 2018-01-18 NOTE — MISCELLANEOUS
Message   Recorded as Task   Date: 01/23/2017 04:19 PM, Created By: Roberto Castro   Task Name: Go to Result   Assigned To: Zhane Corona   Regarding Patient: Marylee Loh, Status: Active   Comment:    Roberto Castro - 23 Jan 2017 4:19 PM     TASK CREATED  neg HCG        Active Problems    1  Amenorrhea (626 0) (N91 2)   2  Anxiety (300 00) (F41 9)   3  Depression (311) (F32 9)   4  Dysmenorrhea (625 3) (N94 6)   5  Encounter for routine gynecological examination (V72 31) (Z01 419)   6  SHARA (generalized anxiety disorder) (300 02) (F41 1)   7  GERD without esophagitis (530 81) (K21 9)   8  History of self breast exam   9  MDD (major depressive disorder), recurrent severe, without psychosis (296 33) (F33 2)   10  Migraine (346 90) (G43 909)    Current Meds   1  Citalopram Hydrobromide 20 MG Oral Tablet; TAKE 1 TABLET DAILY; Therapy: 58SWC2658 to (Bridget Cotton)  Requested for: 01AHR7153; Last   Rx:14Gss0216 Ordered   2  ClonazePAM 1 MG Oral Tablet; TAKE 1 TABLET AT BEDTIME; Therapy: 12TTT3121 to (Evaluate:13Tnx7480); Last NT:74LLS5019 Ordered   3  Naproxen Sodium 550 MG Oral Tablet (Anaprox DS); TAKE 1 TABLET EVERY 12   HOURS as needed; Therapy: 86TPP2993 to (Evaluate:74Qgt0158)  Requested for: 07PBE4710; Last   Rx:48Lqc3434 Ordered   4  Rizatriptan Benzoate 10 MG Oral Tablet Dispersible; TAKE 1 TABLET AT ONSET OF   HEADACHE  MAY REPEAT EVERY 2 HOURS AS NEEDED  MAXIMUM 3 TABLETS IN 24   HOURS; Therapy: 43ATX5224 to (Last Rx:57Fcp7619)  Requested for: 17OAR7201 Ordered    Allergies    1  No Known Drug Allergies   2   No Known Drug Allergies    Signatures   Electronically signed by : Uzma Cook, ; Jan 24 2017  7:53AM EST                       (Author)

## 2018-01-23 NOTE — RESULT NOTES
Discussion/Summary   lab all ok,recheck  in 1 year     Verified Results  (1) CBC/PLT/DIFF 92KSH6398 07:51AM Riley Elena Order Number: [id number]     Test Name Result Flag Reference   WBC COUNT 4 91 Thousand/uL  4 31-10 16   RBC COUNT 4 13 Million/uL  3 81-5 12   HEMOGLOBIN 11 9 g/dL  11 5-15 4   HEMATOCRIT 37 2 %  34 8-46  1   MCV 90 fL  82-98   MCH 28 8 pg  26 8-34 3   MCHC 32 0 g/dL  31 4-37 4   RDW 12 5 %  11 6-15 1   MPV 10 7 fL  8 9-12 7   PLATELET COUNT 915 Thousands/uL  149-390   nRBC AUTOMATED 0 /100 WBCs     NEUTROPHILS RELATIVE PERCENT 57 %  43-75   LYMPHOCYTES RELATIVE PERCENT 32 %  14-44   MONOCYTES RELATIVE PERCENT 9 %  4-12   EOSINOPHILS RELATIVE PERCENT 1 %  0-6   BASOPHILS RELATIVE PERCENT 1 %  0-1   NEUTROPHILS ABSOLUTE COUNT 2 82 Thousands/? ??L  1 85-7 62   LYMPHOCYTES ABSOLUTE COUNT 1 55 Thousands/? ??L  0 60-4 47   MONOCYTES ABSOLUTE COUNT 0 43 Thousand/? ??L  0 17-1 22   EOSINOPHILS ABSOLUTE COUNT 0 05 Thousand/? ??L  0 00-0 61   BASOPHILS ABSOLUTE COUNT 0 04 Thousands/? ??L  0 00-0 10     (1) COMPREHENSIVE METABOLIC PANEL 07VZD5012 67:56AJ Riley Rings Order Number: [id number]     Test Name Result Flag Reference   SODIUM 137 mmol/L  136-145   POTASSIUM 4 6 mmol/L  3 5-5 3   CHLORIDE 105 mmol/L  100-108   CARBON DIOXIDE 28 mmol/L  21-32   ANION GAP (CALC) 4 mmol/L  4-13   BLOOD UREA NITROGEN 11 mg/dL  5-25   CREATININE 0 66 mg/dL  0 60-1 30   Standardized to IDMS reference method   CALCIUM 9 2 mg/dL  8 3-10 1   BILI, TOTAL 0 83 mg/dL  0 20-1 00   ALK PHOSPHATAS 42 U/L L    ALT (SGPT) 16 U/L  12-78   Specimen collection should occur prior to Sulfasalazine and/or Sulfapyridine administration due to the potential for falsely depressed results  AST(SGOT) 10 U/L  5-45   Specimen collection should occur prior to Sulfasalazine administration due to the potential for falsely depressed results     ALBUMIN 3 6 g/dL  3 5-5 0   TOTAL PROTEIN 7 0 g/dL  6 4-8 2   eGFR 80 ml/min/1 73sq m     [facility] Disease Education Program recommendations are as follows:  GFR calculation is accurate only with a steady state creatinine  Chronic Kidney disease less than 60 ml/min/1 73 sq  meters  Kidney failure less than 15 ml/min/1 73 sq  meters  GLUCOSE FASTING 87 mg/dL  65-99   Specimen collection should occur prior to Sulfasalazine administration due to the potential for falsely depressed results  Specimen collection should occur prior to Sulfapyridine administration due to the potential for falsely elevated results  (1) TSH 47QAI9534 07:51AM Kinjal Riley Order Number: [id number]     Test Name Result Flag Reference   TSH 1 760 uIU/mL  0 358-3 740   Patients undergoing fluorescein dye angiography may retain small amounts of fluorescein in the body for 48-72 hours post procedure  Samples containing fluorescein can produce falsely depressed TSH values  If the patient had this procedure,a specimen should be resubmitted post fluorescein clearance  The recommended reference ranges for TSH during pregnancy are as follows:  First trimester 0 1 to 2 5 uIU/mL  Second trimester  0 2 to 3 0 uIU/mL  Third trimester 0 3 to 3 0 uIU/m     (1) LIPID PANEL, FASTING 52Pvn4131 07:51AM Knijal Riley Order Number: [id number]     Test Name Result Flag Reference   CHOLESTEROL 162 mg/dL     HDL,DIRECT 84 mg/dL H 40-60   Specimen collection should occur prior to Metamizole administration due to the potential for falsley depressed results  LDL CHOLESTEROL CALCULATED 55 mg/dL  0-100   Triglyceride:        Normal <150 mg/dl   Borderline High 150-199 mg/dl   High 200-499 mg/dl   Very High >499 mg/dl      Cholesterol:       Desirable <200 mg/dl    Borderline High 200-239 mg/dl    High >239 mg/dl      HDL Cholesterol:       High>59 mg/dL    Low <41 mg/dL      This screening LDL is a calculated result     It does not have the accuracy of the Direct Measured LDL in the monitoring of patients with hyperlipidemia and/or statin therapy  Direct Measure LDL (PEU173) must be ordered separately in these patients  TRIGLYCERIDES 114 mg/dL  <=150   Specimen collection should occur prior to N-Acetylcysteine or Metamizole administration due to the potential for falsely depressed results

## 2018-03-31 DIAGNOSIS — K21.9 GERD WITHOUT ESOPHAGITIS: Primary | ICD-10-CM

## 2018-04-01 RX ORDER — RANITIDINE 300 MG/1
TABLET ORAL
Qty: 30 TABLET | Refills: 5 | Status: SHIPPED | OUTPATIENT
Start: 2018-04-01 | End: 2018-08-03 | Stop reason: SDUPTHER

## 2018-04-27 ENCOUNTER — TELEPHONE (OUTPATIENT)
Dept: PSYCHIATRY | Facility: CLINIC | Age: 33
End: 2018-04-27

## 2018-04-27 DIAGNOSIS — F33.2 MDD (MAJOR DEPRESSIVE DISORDER), RECURRENT SEVERE, WITHOUT PSYCHOSIS (HCC): Primary | ICD-10-CM

## 2018-04-27 RX ORDER — CITALOPRAM 20 MG/1
20 TABLET ORAL DAILY
Qty: 30 TABLET | Refills: 2 | Status: SHIPPED | OUTPATIENT
Start: 2018-04-27 | End: 2018-06-27 | Stop reason: SDUPTHER

## 2018-04-27 RX ORDER — CITALOPRAM 20 MG/1
1 TABLET ORAL DAILY
COMMUNITY
Start: 2013-04-09 | End: 2018-04-27 | Stop reason: SDUPTHER

## 2018-05-02 ENCOUNTER — OFFICE VISIT (OUTPATIENT)
Dept: PSYCHIATRY | Facility: CLINIC | Age: 33
End: 2018-05-02
Payer: COMMERCIAL

## 2018-05-02 DIAGNOSIS — F41.1 GAD (GENERALIZED ANXIETY DISORDER): Primary | ICD-10-CM

## 2018-05-02 PROBLEM — F33.2 MDD (MAJOR DEPRESSIVE DISORDER), RECURRENT SEVERE, WITHOUT PSYCHOSIS (HCC): Status: ACTIVE | Noted: 2017-01-04

## 2018-05-02 PROBLEM — N91.2 AMENORRHEA: Status: ACTIVE | Noted: 2017-01-23

## 2018-05-02 PROBLEM — K58.9 IRRITABLE BOWEL SYNDROME: Status: ACTIVE | Noted: 2017-11-29

## 2018-05-02 PROBLEM — K29.70 GASTRITIS: Status: ACTIVE | Noted: 2017-09-27

## 2018-05-02 PROCEDURE — 99213 OFFICE O/P EST LOW 20 MIN: CPT | Performed by: PSYCHIATRY & NEUROLOGY

## 2018-05-02 RX ORDER — RIZATRIPTAN BENZOATE 10 MG/1
1 TABLET, ORALLY DISINTEGRATING ORAL AS NEEDED
COMMUNITY
Start: 2016-03-08 | End: 2021-01-12 | Stop reason: SDUPTHER

## 2018-05-02 RX ORDER — CLONAZEPAM 1 MG/1
1 TABLET ORAL
Qty: 30 TABLET | Refills: 2 | Status: SHIPPED | OUTPATIENT
Start: 2018-05-02 | End: 2019-01-04 | Stop reason: SDUPTHER

## 2018-05-02 RX ORDER — HYOSCYAMINE SULFATE 0.12 MG/5ML
1 LIQUID ORAL 3 TIMES DAILY PRN
COMMUNITY
Start: 2011-07-05 | End: 2019-12-27 | Stop reason: ALTCHOICE

## 2018-05-02 RX ORDER — CLONAZEPAM 1 MG/1
1 TABLET ORAL
COMMUNITY
Start: 2013-04-09 | End: 2018-05-02 | Stop reason: SDUPTHER

## 2018-05-02 RX ORDER — RANITIDINE 300 MG/1
1 TABLET ORAL
COMMUNITY
Start: 2017-09-27 | End: 2018-05-02 | Stop reason: SDUPTHER

## 2018-05-02 NOTE — PSYCH
Subjective: Medication Management      Patient ID: Aurora Jones is a 29 y o  female  HPI ROS Appetite Changes and Sleep: normal appetite, normal energy level, no weight change and normal number of sleep hours   Patient stated her mood has  Been stable and denies medication side effects  No recent health changes  Or new medications  She stated that even though her job is very stressful she has not had any recent panic attacks and her anxiety is better controlled  Review Of Systems:     Mood Anxiety and Depression   Behavior Normal    Thought Content Disturbing Thoughts, Feelings and Unreasonalbe or Irrational Fears   General Relationship Problems and Emotional Problems   Personality Normal   Other Psych Symptoms Normal   Constitutional Negative   ENT Negative   Cardiovascular Negative   Respiratory Negative   Gastrointestinal Negative   Genitourinary Negative   Musculoskeletal Negative   Integumentary Negative   Neurological Negative   Endocrine Normal    Other Symptoms Normal              Laboratory Results: No results found for this or any previous visit      Substance Abuse History:  History   Drug Use No       Family Psychiatric History:   Family History   Problem Relation Age of Onset    Hypothyroidism Mother     Other Maternal Grandmother      Thyroid disorder    Colon cancer Paternal Grandmother     Other Paternal Grandmother      Thyroid disorder    Colon cancer Paternal Grandfather     Other Paternal Grandfather      Thyroid disorder    Other Maternal Aunt      Thyroid disorder    Other Paternal Aunt      Thyroid disorder    Other Maternal Uncle      Thyroid disorder    Colon cancer Paternal Uncle     Crohn's disease Paternal Uncle     Other Paternal Uncle      Thyroid disorder    Other Family      Benign polyps of the large intestine       The following portions of the patient's history were reviewed and updated as appropriate: allergies, current medications, past family history, past medical history, past social history, past surgical history and problem list     Social History     Social History    Marital status: Single     Spouse name: N/A    Number of children: N/A    Years of education: N/A     Occupational History    Not on file       Social History Main Topics    Smoking status: Former Smoker     Quit date: 2011    Smokeless tobacco: Not on file    Alcohol use Yes      Comment: Social drinker    Drug use: No    Sexual activity: Not on file      Comment: Birth control not practiced     Other Topics Concern    Not on file     Social History Narrative    Caffeine use    Daily coffee consumption (1 cup/day)    Denied:  History of     Scientologist affiliation:  None    Uses safety equipment - seatbelts     Social History     Social History Narrative    Caffeine use    Daily coffee consumption (1 cup/day)    Denied:  History of     Scientologist affiliation:  None    Uses safety equipment - seatbelts       Objective:       Mental status:  Appearance calm and cooperative , adequate hygiene and grooming and good eye contact    Mood depressed and anxious   Affect affect was constricted   Speech a normal rate   Thought Processes coherent/organized and normal thought processes   Hallucinations no hallucinations present    Thought Content no delusions   Abnormal Thoughts no suicidal thoughts  and no homicidal thoughts    Orientation  oriented to person and place and time   Remote Memory short term memory intact and long term memory intact   Attention Span concentration intact   Intellect Appears to be of Average Intelligence   Insight Limited insight   Judgement judgment was limited   Muscle Strength Muscle strength and tone were normal and Normal gait    Language no difficulty naming common objects, no difficulty repeating a phrase  and no difficulty writing a sentence    Fund of Knowledge displays adequate knowledge of current events, adequate fund of knowledge regarding past history and adequate fund of knowledge regarding vocabulary    Pain none   Pain Scale 0       Assessment/Plan:       Diagnoses and all orders for this visit:    SHARA (generalized anxiety disorder)  -     clonazePAM (KlonoPIN) 1 mg tablet; Take 1 tablet (1 mg total) by mouth daily at bedtime    Other orders  -    hyoscyamine (LEVSIN) 0 125 MG/5ML ELIX; Place 1 tablet under the tongue 3 (three) times a day as needed  -     rizatriptan (MAXALT-MLT) 10 MG disintegrating tablet; Take 1 tablet by mouth as needed  -       Treatment Recommendations- Risks Benefits      Immediate Medical/Psychiatric/Psychotherapy Treatments and Any Precautions: continue current treatment     Risks, Benefits And Possible Side Effects Of Medications:  {PSYCH RISK, BENEFITS AND POSSIBLE SIDE EFFECTS (Optional):17600    Controlled Medication Discussion: Discussed with patient Black Box warning on concurrent use of benzodiazepines and opioid medications including sedation, respiratory depression, coma and death  Patient understands the risk of treatment with benzodiazepines in addition to opioids and wants to continue taking those medications  , Discussed with patient the risks of sedation, respiratory depression, impairment of ability to drive and potential for abuse and addiction related to treatment with benzodiazepine medications  The patient understands risk of treatment with benzodiazepine medications, agrees to not drive if feels impaired and agrees to take medications as prescribed   and The patient has been filling controlled prescriptions on time as prescribed to [place] [address] program

## 2018-06-04 ENCOUNTER — TELEPHONE (OUTPATIENT)
Dept: OBGYN CLINIC | Facility: CLINIC | Age: 33
End: 2018-06-04

## 2018-06-04 NOTE — TELEPHONE ENCOUNTER
She really would have had better results if she had called earlier  The she could try taking Provera on the 1st day of her menstrual cycle and this should stop her bleeding within a couple days  She will then  Bleed  after she is finished taking the Provera

## 2018-06-25 ENCOUNTER — TELEPHONE (OUTPATIENT)
Dept: FAMILY MEDICINE CLINIC | Facility: CLINIC | Age: 33
End: 2018-06-25

## 2018-06-27 DIAGNOSIS — F33.2 MDD (MAJOR DEPRESSIVE DISORDER), RECURRENT SEVERE, WITHOUT PSYCHOSIS (HCC): ICD-10-CM

## 2018-06-27 RX ORDER — CITALOPRAM 20 MG/1
TABLET ORAL
Qty: 30 TABLET | Refills: 0 | Status: SHIPPED | OUTPATIENT
Start: 2018-06-27 | End: 2018-07-06 | Stop reason: SDUPTHER

## 2018-07-06 ENCOUNTER — TELEPHONE (OUTPATIENT)
Dept: BEHAVIORAL/MENTAL HEALTH CLINIC | Facility: CLINIC | Age: 33
End: 2018-07-06

## 2018-07-06 DIAGNOSIS — F33.2 MDD (MAJOR DEPRESSIVE DISORDER), RECURRENT SEVERE, WITHOUT PSYCHOSIS (HCC): ICD-10-CM

## 2018-07-06 RX ORDER — CITALOPRAM 20 MG/1
20 TABLET ORAL DAILY
Qty: 90 TABLET | Refills: 0 | Status: SHIPPED | OUTPATIENT
Start: 2018-07-06 | End: 2018-10-01 | Stop reason: SDUPTHER

## 2018-07-13 ENCOUNTER — OFFICE VISIT (OUTPATIENT)
Dept: FAMILY MEDICINE CLINIC | Facility: CLINIC | Age: 33
End: 2018-07-13
Payer: COMMERCIAL

## 2018-07-13 VITALS
TEMPERATURE: 98.6 F | HEIGHT: 65 IN | SYSTOLIC BLOOD PRESSURE: 110 MMHG | BODY MASS INDEX: 22.66 KG/M2 | WEIGHT: 136 LBS | DIASTOLIC BLOOD PRESSURE: 72 MMHG

## 2018-07-13 DIAGNOSIS — H65.02 ACUTE SEROUS OTITIS MEDIA OF LEFT EAR, RECURRENCE NOT SPECIFIED: Primary | ICD-10-CM

## 2018-07-13 PROCEDURE — 3008F BODY MASS INDEX DOCD: CPT | Performed by: FAMILY MEDICINE

## 2018-07-13 PROCEDURE — 99213 OFFICE O/P EST LOW 20 MIN: CPT | Performed by: FAMILY MEDICINE

## 2018-07-13 RX ORDER — PREDNISONE 10 MG/1
TABLET ORAL
Qty: 15 TABLET | Refills: 0 | Status: SHIPPED | OUTPATIENT
Start: 2018-07-13 | End: 2018-09-20

## 2018-07-13 NOTE — PROGRESS NOTES
Assessment/Plan:         Diagnoses and all orders for this visit:    Acute serous otitis media of left ear, recurrence not specified          Subjective:   Chief Complaint   Patient presents with    Earache     left started today           Patient ID: Pily Russ is a 29 y o  female  Patient is a 37-year-old female complaining of sudden onset of left ear pain that woke her this morning from sleep  She has had some cold symptoms last couple days  Denies fever chills, productive cough  Has mild hearing loss on that side  The following portions of the patient's history were reviewed and updated as appropriate: allergies, current medications, past medical history, past social history and problem list     Review of Systems   Constitutional: Positive for activity change, appetite change and fatigue  HENT: Positive for ear pain, hearing loss, postnasal drip and rhinorrhea  Negative for ear discharge, sinus pain and sinus pressure  Eyes: Negative  Respiratory: Negative for cough, shortness of breath and wheezing  Cardiovascular: Negative  Gastrointestinal: Negative  Endocrine: Negative  Genitourinary: Negative  Musculoskeletal: Negative for myalgias  Skin: Negative for rash  Allergic/Immunologic: Negative  Neurological: Negative  Hematological: Negative  Objective:      /72   Temp 98 6 °F (37 °C)   Ht 5' 4 5" (1 638 m)   Wt 61 7 kg (136 lb)   BMI 22 98 kg/m²          Physical Exam   Constitutional: She is oriented to person, place, and time  She appears well-developed and well-nourished  HENT:   Head: Normocephalic and atraumatic  Right Ear: External ear normal  Tympanic membrane is not injected, not scarred and not perforated  No middle ear effusion  Left Ear: External ear normal  Tympanic membrane is not injected, not scarred and not perforated  A middle ear effusion is present  Nose: Rhinorrhea present  No mucosal edema   Right sinus exhibits no maxillary sinus tenderness and no frontal sinus tenderness  Left sinus exhibits no maxillary sinus tenderness and no frontal sinus tenderness  Mouth/Throat: Oropharynx is clear and moist    Eyes: EOM are normal  Pupils are equal, round, and reactive to light  Neck: Normal range of motion  Cardiovascular: Normal rate, regular rhythm and normal heart sounds  Pulmonary/Chest: Effort normal and breath sounds normal  She has no wheezes  Abdominal: Soft  Bowel sounds are normal    Neurological: She is alert and oriented to person, place, and time  Skin: Skin is warm and dry  Psychiatric: She has a normal mood and affect   Thought content normal

## 2018-08-03 DIAGNOSIS — K21.9 GERD WITHOUT ESOPHAGITIS: ICD-10-CM

## 2018-08-03 RX ORDER — RANITIDINE 300 MG/1
TABLET ORAL
Qty: 30 TABLET | Refills: 1 | Status: SHIPPED | OUTPATIENT
Start: 2018-08-03 | End: 2018-10-03 | Stop reason: SDUPTHER

## 2018-09-20 ENCOUNTER — ANNUAL EXAM (OUTPATIENT)
Dept: OBGYN CLINIC | Facility: CLINIC | Age: 33
End: 2018-09-20
Payer: COMMERCIAL

## 2018-09-20 VITALS
BODY MASS INDEX: 23.32 KG/M2 | SYSTOLIC BLOOD PRESSURE: 128 MMHG | HEIGHT: 65 IN | DIASTOLIC BLOOD PRESSURE: 62 MMHG | WEIGHT: 140 LBS

## 2018-09-20 DIAGNOSIS — Z12.4 CERVICAL CANCER SCREENING: ICD-10-CM

## 2018-09-20 DIAGNOSIS — Z01.419 ENCOUNTER FOR ANNUAL ROUTINE GYNECOLOGICAL EXAMINATION: Primary | ICD-10-CM

## 2018-09-20 DIAGNOSIS — Z11.51 SCREENING FOR HPV (HUMAN PAPILLOMAVIRUS): ICD-10-CM

## 2018-09-20 PROCEDURE — S0612 ANNUAL GYNECOLOGICAL EXAMINA: HCPCS | Performed by: OBSTETRICS & GYNECOLOGY

## 2018-09-20 PROCEDURE — G0145 SCR C/V CYTO,THINLAYER,RESCR: HCPCS | Performed by: OBSTETRICS & GYNECOLOGY

## 2018-09-20 PROCEDURE — 87624 HPV HI-RISK TYP POOLED RSLT: CPT | Performed by: OBSTETRICS & GYNECOLOGY

## 2018-09-20 NOTE — PROGRESS NOTES
Assessment/Plan:    Pap and HPV done today    She will keep a menstrual calendar    No problem-specific Assessment & Plan notes found for this encounter  Diagnoses and all orders for this visit:    Encounter for annual routine gynecological examination  -     Liquid-based pap, screening    Cervical cancer screening  -     Liquid-based pap, screening    Screening for HPV (human papillomavirus)  -     Liquid-based pap, screening          Subjective:      Patient ID: Ronaldo Bee is a 34 y o  female  Patient here for yearly  She has no complaints  She has regular menstrual cycles  She is due for a Pap this year  She has been in a same-sex relationship and has recently , she does not need contraception  She has issues with irritable bowel syndrome although she is able to keep it under fairly good control  The following portions of the patient's history were reviewed and updated as appropriate: allergies, current medications, past family history, past medical history, past social history, past surgical history and problem list     Review of Systems   Constitutional: Negative  HENT: Negative  Eyes: Negative  Respiratory: Negative  Cardiovascular: Negative  Gastrointestinal: Negative  Endocrine: Negative  Genitourinary: Negative  Musculoskeletal: Negative  Skin: Negative  Allergic/Immunologic: Negative  Neurological: Negative  Hematological: Negative  Psychiatric/Behavioral: Negative  Objective:      /62 (BP Location: Left arm, Patient Position: Sitting, Cuff Size: Standard)   Ht 5' 4 5" (1 638 m)   Wt 63 5 kg (140 lb)   LMP 09/01/2018 (Exact Date)   BMI 23 66 kg/m²          Physical Exam   Constitutional: She appears well-developed  Neck: No tracheal deviation present  No thyromegaly present  Cardiovascular: Normal rate and regular rhythm      Pulmonary/Chest: Effort normal and breath sounds normal  Right breast exhibits no inverted nipple, no mass, no nipple discharge, no skin change and no tenderness  Left breast exhibits no inverted nipple, no mass, no nipple discharge, no skin change and no tenderness  Breasts are symmetrical    Examined seated and supine   Abdominal: Soft  She exhibits no distension and no mass  There is no tenderness  Genitourinary: Rectum normal, vagina normal and uterus normal  No labial fusion  There is no rash, tenderness, lesion or injury on the right labia  There is no rash, tenderness, lesion or injury on the left labia  Cervix exhibits no motion tenderness, no discharge and no friability  Right adnexum displays no mass, no tenderness and no fullness  Left adnexum displays no mass, no tenderness and no fullness  Vitals reviewed

## 2018-09-25 LAB
HPV HR 12 DNA CVX QL NAA+PROBE: NEGATIVE
HPV16 DNA CVX QL NAA+PROBE: NEGATIVE
HPV18 DNA CVX QL NAA+PROBE: NEGATIVE

## 2018-09-26 LAB
LAB AP GYN PRIMARY INTERPRETATION: NORMAL
Lab: NORMAL

## 2018-10-01 ENCOUNTER — TELEPHONE (OUTPATIENT)
Dept: OBGYN CLINIC | Facility: CLINIC | Age: 33
End: 2018-10-01

## 2018-10-01 DIAGNOSIS — F33.2 MDD (MAJOR DEPRESSIVE DISORDER), RECURRENT SEVERE, WITHOUT PSYCHOSIS (HCC): ICD-10-CM

## 2018-10-01 RX ORDER — CITALOPRAM 20 MG/1
TABLET ORAL
Qty: 90 TABLET | Refills: 0 | Status: SHIPPED | OUTPATIENT
Start: 2018-10-01 | End: 2019-01-04 | Stop reason: SDUPTHER

## 2018-10-01 NOTE — TELEPHONE ENCOUNTER
----- Message from Alejandra Daniels DO sent at 10/1/2018 10:02 AM EDT -----  Normal pap, negative HPV

## 2018-10-03 DIAGNOSIS — K21.9 GERD WITHOUT ESOPHAGITIS: ICD-10-CM

## 2018-10-03 RX ORDER — RANITIDINE 300 MG/1
TABLET ORAL
Qty: 30 TABLET | Refills: 1 | Status: SHIPPED | OUTPATIENT
Start: 2018-10-03 | End: 2018-12-11 | Stop reason: SDUPTHER

## 2018-11-01 ENCOUNTER — DOCUMENTATION (OUTPATIENT)
Dept: PSYCHIATRY | Facility: CLINIC | Age: 33
End: 2018-11-01

## 2018-11-01 NOTE — PROGRESS NOTES
Treatment Plan not completed within required time limits due to :   Follow up appointment scheduled over the 120 days from [address] [place] [address] Rd 5/2/2018

## 2018-11-01 NOTE — PROGRESS NOTES
Treatment Plan not completed within required time limits due to: cancelled appointment  on 11/1/2018

## 2018-12-11 DIAGNOSIS — K21.9 GERD WITHOUT ESOPHAGITIS: ICD-10-CM

## 2018-12-11 RX ORDER — RANITIDINE 300 MG/1
TABLET ORAL
Qty: 30 TABLET | Refills: 1 | Status: SHIPPED | OUTPATIENT
Start: 2018-12-11 | End: 2019-03-12 | Stop reason: SDUPTHER

## 2019-01-04 ENCOUNTER — OFFICE VISIT (OUTPATIENT)
Dept: PSYCHIATRY | Facility: CLINIC | Age: 34
End: 2019-01-04
Payer: COMMERCIAL

## 2019-01-04 DIAGNOSIS — F33.2 MDD (MAJOR DEPRESSIVE DISORDER), RECURRENT SEVERE, WITHOUT PSYCHOSIS (HCC): ICD-10-CM

## 2019-01-04 DIAGNOSIS — F41.1 GAD (GENERALIZED ANXIETY DISORDER): ICD-10-CM

## 2019-01-04 PROCEDURE — 99213 OFFICE O/P EST LOW 20 MIN: CPT | Performed by: PSYCHIATRY & NEUROLOGY

## 2019-01-04 RX ORDER — CLONAZEPAM 1 MG/1
1 TABLET ORAL
Qty: 30 TABLET | Refills: 2 | Status: SHIPPED | OUTPATIENT
Start: 2019-01-04 | End: 2019-06-26 | Stop reason: SDUPTHER

## 2019-01-04 RX ORDER — CITALOPRAM 20 MG/1
20 TABLET ORAL DAILY
Qty: 90 TABLET | Refills: 0 | Status: SHIPPED | OUTPATIENT
Start: 2019-01-04 | End: 2019-04-10 | Stop reason: SDUPTHER

## 2019-01-04 NOTE — PSYCH
Subjective: Medication Management      Patient ID: Clinton Ramesh is a 34 y o  female  HPI ROS Appetite Changes and Sleep: normal appetite, normal energy level, no weight change and normal number of sleep hours   Patient stated she has been able to tolerate a dose decrease in Citalopram and her mood has been stable  She reported poor sleep due to teeth grinding for past 2 months  Agrees to restart Clonazepam 0 5 mg to 1 mg qhs     Review Of Systems:     Mood Anxiety, Depression and Emotional Lability   Behavior Normal    Thought Content Disturbing Thoughts, Feelings and Unreasonalbe or Irrational Fears   General Emotional Problems, Sleep Disturbances and Decreased Functioning   Personality Normal   Other Psych Symptoms Normal   Constitutional Negative   ENT Negative   Cardiovascular Negative   Respiratory Negative   Gastrointestinal Negative   Genitourinary Negative   Musculoskeletal Negative   Integumentary Negative   Neurological Negative   Endocrine Normal    Other Symptoms Normal              Laboratory Results: No results found for this or any previous visit      Substance Abuse History:  History   Drug Use No       Family Psychiatric History:   Family History   Problem Relation Age of Onset    Hypothyroidism Mother     Other Maternal Grandmother         Thyroid disorder    Colon cancer Paternal Grandmother     Other Paternal Grandmother         Thyroid disorder    Colon cancer Paternal Grandfather     Other Paternal Grandfather         Thyroid disorder    Other Maternal Aunt         Thyroid disorder    Other Paternal Aunt         Thyroid disorder    Other Maternal Uncle         Thyroid disorder    Colon cancer Paternal Uncle     Crohn's disease Paternal Uncle     Other Paternal Uncle         Thyroid disorder    Other Family         Benign polyps of the large intestine    No Known Problems Father        The following portions of the patient's history were reviewed and updated as appropriate: allergies, current medications, past family history, past medical history, past social history, past surgical history and problem list     Social History     Social History    Marital status: Single     Spouse name: N/A    Number of children: N/A    Years of education: N/A     Occupational History    Not on file       Social History Main Topics    Smoking status: Former Smoker     Quit date: 2011    Smokeless tobacco: Never Used    Alcohol use Yes      Comment: Social drinker    Drug use: No    Sexual activity: Yes     Partners: Female      Comment: Birth control not practiced     Other Topics Concern    Not on file     Social History Narrative    Caffeine use    Daily coffee consumption (1 cup/day)    Denied:  History of     Mu-ism affiliation:  None    Uses safety equipment - seatbelts     Social History     Social History Narrative    Caffeine use    Daily coffee consumption (1 cup/day)    Denied:  History of     Mu-ism affiliation:  None    Uses safety equipment - seatbelts       Objective:       Mental status:  Appearance calm and cooperative , adequate hygiene and grooming and good eye contact    Mood depressed and anxious   Affect affect was constricted   Speech a normal rate and fluent   Thought Processes coherent/organized and normal thought processes   Hallucinations no hallucinations present    Thought Content no delusions   Abnormal Thoughts no suicidal thoughts  and no homicidal thoughts    Orientation  oriented to person and place and time   Remote Memory short term memory intact and long term memory intact   Attention Span concentration intact   Intellect Appears to be of Average Intelligence   Insight Limited insight   Judgement judgment was limited   Muscle Strength Muscle strength and tone were normal and Normal gait    Language no difficulty naming common objects, no difficulty repeating a phrase  and no difficulty writing a sentence    Fund of Knowledge displays adequate knowledge of current events, adequate fund of knowledge regarding past history and adequate fund of knowledge regarding vocabulary    Pain none   Pain Scale 0       Assessment/Plan:       Diagnoses and all orders for this visit:    MDD (major depressive disorder), recurrent severe, without psychosis ([facility] )  -     citalopram (CeleXA) 20 mg tablet; Take 1 tablet (20 mg total) by mouth daily    SHARA (generalized anxiety disorder)  -     clonazePAM (KlonoPIN) 1 mg tablet; Take 1 tablet (1 mg total) by mouth daily at bedtime            Treatment Recommendations- Risks Benefits      Immediate Medical/Psychiatric/Psychotherapy Treatments and Any Precautions: restart Clonazepam 0 5 mg to 1 mg po qhs     Risks, Benefits And Possible Side Effects Of Medications:  {PSYCH RISK, BENEFITS AND POSSIBLE SIDE EFFECTS (Optional):21922    Controlled Medication Discussion: Discussed with patient Black Box warning on concurrent use of benzodiazepines and opioid medications including sedation, respiratory depression, coma and death  Patient understands the risk of treatment with benzodiazepines in addition to opioids and wants to continue taking those medications  , Discussed with patient the risks of sedation, respiratory depression, impairment of ability to drive and potential for abuse and addiction related to treatment with benzodiazepine medications  The patient understands risk of treatment with benzodiazepine medications, agrees to not drive if feels impaired and agrees to take medications as prescribed   and The patient has been filling controlled prescriptions on time as prescribed to [facility]  program

## 2019-03-12 DIAGNOSIS — K21.9 GERD WITHOUT ESOPHAGITIS: ICD-10-CM

## 2019-03-12 RX ORDER — RANITIDINE 300 MG/1
TABLET ORAL
Qty: 30 TABLET | Refills: 1 | Status: SHIPPED | OUTPATIENT
Start: 2019-03-12 | End: 2019-05-13 | Stop reason: SDUPTHER

## 2019-04-10 DIAGNOSIS — F33.2 MDD (MAJOR DEPRESSIVE DISORDER), RECURRENT SEVERE, WITHOUT PSYCHOSIS (HCC): ICD-10-CM

## 2019-04-10 RX ORDER — CITALOPRAM 20 MG/1
TABLET ORAL
Qty: 90 TABLET | Refills: 0 | Status: SHIPPED | OUTPATIENT
Start: 2019-04-10 | End: 2019-06-17 | Stop reason: SDUPTHER

## 2019-05-13 DIAGNOSIS — K21.9 GERD WITHOUT ESOPHAGITIS: ICD-10-CM

## 2019-05-13 RX ORDER — RANITIDINE 300 MG/1
TABLET ORAL
Qty: 30 TABLET | Refills: 1 | Status: SHIPPED | OUTPATIENT
Start: 2019-05-13 | End: 2019-08-02 | Stop reason: SDUPTHER

## 2019-05-22 ENCOUNTER — OFFICE VISIT (OUTPATIENT)
Dept: FAMILY MEDICINE CLINIC | Facility: CLINIC | Age: 34
End: 2019-05-22
Payer: COMMERCIAL

## 2019-05-22 VITALS
DIASTOLIC BLOOD PRESSURE: 78 MMHG | HEIGHT: 65 IN | WEIGHT: 140 LBS | BODY MASS INDEX: 23.32 KG/M2 | SYSTOLIC BLOOD PRESSURE: 120 MMHG

## 2019-05-22 DIAGNOSIS — K58.0 IRRITABLE BOWEL SYNDROME WITH DIARRHEA: ICD-10-CM

## 2019-05-22 DIAGNOSIS — M23.51 RECURRENT RIGHT KNEE INSTABILITY: ICD-10-CM

## 2019-05-22 DIAGNOSIS — F33.2 MDD (MAJOR DEPRESSIVE DISORDER), RECURRENT SEVERE, WITHOUT PSYCHOSIS (HCC): ICD-10-CM

## 2019-05-22 DIAGNOSIS — F41.1 GAD (GENERALIZED ANXIETY DISORDER): ICD-10-CM

## 2019-05-22 DIAGNOSIS — M25.371 RIGHT ANKLE INSTABILITY: ICD-10-CM

## 2019-05-22 DIAGNOSIS — K21.9 GERD WITHOUT ESOPHAGITIS: Primary | ICD-10-CM

## 2019-05-22 DIAGNOSIS — G43.009 MIGRAINE WITHOUT AURA AND WITHOUT STATUS MIGRAINOSUS, NOT INTRACTABLE: ICD-10-CM

## 2019-05-22 PROBLEM — K29.70 GASTRITIS: Status: RESOLVED | Noted: 2017-09-27 | Resolved: 2019-05-22

## 2019-05-22 PROCEDURE — 99214 OFFICE O/P EST MOD 30 MIN: CPT | Performed by: FAMILY MEDICINE

## 2019-06-17 DIAGNOSIS — F33.2 MDD (MAJOR DEPRESSIVE DISORDER), RECURRENT SEVERE, WITHOUT PSYCHOSIS (HCC): ICD-10-CM

## 2019-06-17 RX ORDER — CITALOPRAM 20 MG/1
TABLET ORAL
Qty: 90 TABLET | Refills: 0 | Status: SHIPPED | OUTPATIENT
Start: 2019-06-17 | End: 2019-09-23 | Stop reason: SDUPTHER

## 2019-06-24 ENCOUNTER — OFFICE VISIT (OUTPATIENT)
Dept: OBGYN CLINIC | Facility: MEDICAL CENTER | Age: 34
End: 2019-06-24
Payer: COMMERCIAL

## 2019-06-24 ENCOUNTER — APPOINTMENT (OUTPATIENT)
Dept: RADIOLOGY | Facility: CLINIC | Age: 34
End: 2019-06-24
Payer: COMMERCIAL

## 2019-06-24 VITALS
HEART RATE: 84 BPM | HEIGHT: 65 IN | BODY MASS INDEX: 21.99 KG/M2 | DIASTOLIC BLOOD PRESSURE: 74 MMHG | WEIGHT: 132 LBS | SYSTOLIC BLOOD PRESSURE: 118 MMHG

## 2019-06-24 DIAGNOSIS — M23.51 RECURRENT RIGHT KNEE INSTABILITY: ICD-10-CM

## 2019-06-24 DIAGNOSIS — M25.371 RIGHT ANKLE INSTABILITY: ICD-10-CM

## 2019-06-24 DIAGNOSIS — M22.2X1 PATELLOFEMORAL SYNDROME OF RIGHT KNEE: Primary | ICD-10-CM

## 2019-06-24 PROCEDURE — 99243 OFF/OP CNSLTJ NEW/EST LOW 30: CPT | Performed by: ORTHOPAEDIC SURGERY

## 2019-06-24 PROCEDURE — 73610 X-RAY EXAM OF ANKLE: CPT

## 2019-06-24 PROCEDURE — 73564 X-RAY EXAM KNEE 4 OR MORE: CPT

## 2019-06-26 ENCOUNTER — DOCUMENTATION (OUTPATIENT)
Dept: PSYCHIATRY | Facility: CLINIC | Age: 34
End: 2019-06-26

## 2019-06-26 DIAGNOSIS — F41.1 GAD (GENERALIZED ANXIETY DISORDER): ICD-10-CM

## 2019-06-27 RX ORDER — CLONAZEPAM 1 MG/1
1 TABLET ORAL
Qty: 30 TABLET | Refills: 2 | Status: SHIPPED | OUTPATIENT
Start: 2019-06-27 | End: 2019-12-27 | Stop reason: SDUPTHER

## 2019-07-09 ENCOUNTER — EVALUATION (OUTPATIENT)
Dept: PHYSICAL THERAPY | Age: 34
End: 2019-07-09
Payer: COMMERCIAL

## 2019-07-09 DIAGNOSIS — M23.51 RECURRENT RIGHT KNEE INSTABILITY: ICD-10-CM

## 2019-07-09 DIAGNOSIS — M25.371 ANKLE INSTABILITY, RIGHT: ICD-10-CM

## 2019-07-09 DIAGNOSIS — M54.50 ACUTE MIDLINE LOW BACK PAIN WITHOUT SCIATICA: ICD-10-CM

## 2019-07-09 DIAGNOSIS — M22.2X1 PATELLOFEMORAL SYNDROME OF RIGHT KNEE: Primary | ICD-10-CM

## 2019-07-09 PROCEDURE — 97162 PT EVAL MOD COMPLEX 30 MIN: CPT | Performed by: PHYSICAL THERAPIST

## 2019-07-09 NOTE — PROGRESS NOTES
PT Evaluation     Today's date: 2019  Patient name: Willard Flood  : 1985  MRN: [id number]  Referring provider: Kev Jacobson DO  Dx:   Encounter Diagnosis     ICD-10-CM    1  Patellofemoral syndrome of right knee M22 2X1 Ambulatory referral to Physical Therapy     PT plan of care cert/re-cert   2  Recurrent right knee instability M23 51 Ambulatory referral to Physical Therapy     PT plan of care cert/re-cert   3  Acute midline low back pain without sciatica M54 5    4  Ankle instability, right M25 371        Start Time: 730  Stop Time: 830  Total time in clinic (min): 60 minutes    Assessment  Assessment details: Patient seen for PT evaluation  Patient presents with significant weakness and ROM in R ankle, R>L hip, lumbar, core weakness- all which appears to attribute to knee pain, does present with moderate weakness at VMO and quads  PT initiated HEP for gentle stretching ankle, hip and knee, kinesiotaped ankle for support  Taught patient how to tape her ankle; recommended to trial kinesiotape for karate  Patient is an excellent candidate for PT to address tightness, weakness and balance  Patient with high copay for PT - planning for 1-2x/week x 6 weeks as she's able to afford  Impairments: abnormal gait, abnormal or restricted ROM, activity intolerance, impaired balance, impaired physical strength, lacks appropriate home exercise program, pain with function and poor body mechanics  Functional limitations: Patient reports moderate limitations with IADLs, standing, walking tolerance, pain with stairs and recreational activities (ie karate)Understanding of Dx/Px/POC: good   Prognosis: good    Goals  Impairment Goals to be met within 4 weeks    - Decrease pain to 0/10 at rest and with activity  - Improve ROM by 5-10 degrees lumbar spine  - Improve ROM by 5-10 degrees R ankle  - Increase strength to 5/5 throughout  - Patient to be able to sustain balance x 15 sec foam surface     Functional Goals to be met within 4-6 weeks  - Return to Prior Level of Function  - Increase Functional Status Measure to: expected  - Patient will be independent with HEP  - Patient to be able to tolerate karate with pain < 2/10 in R knee       Plan  Patient would benefit from: skilled physical therapy  Planned modality interventions: cryotherapy and thermotherapy: hydrocollator packs  Planned therapy interventions: abdominal trunk stabilization, joint mobilization, manual therapy, neuromuscular re-education, patient education, postural training, strengthening, stretching, therapeutic activities, therapeutic exercise, home exercise program, graded activity, gait training and balance  Frequency: 2x week  Duration in weeks: 6  Treatment plan discussed with: patient        Subjective Evaluation    History of Present Illness  Mechanism of injury: Patient reports onset of R knee pain x 3 months ago  Patient does report increased R ankle instability and increased episodes of falling as a result of rolling her ankle  Patient has history of ankle injury as a child playing sports  Pain  Current pain ratin  At best pain ratin  At worst pain rating: 3  Location: posterior knee joint- previous pain pain 9/10 x ~ 2-3 weeks ago;  ankle pain at worse- 9/10 (x 2-3 weeks ago)  Quality: dull ache and sharp  Aggravating factors: walking, stair climbing and running  Progression: improved    Social Support    Employment status: working    Diagnostic Tests  No diagnostic tests performed  X-ray: normal (x ray for ankle negative)  Treatments  No previous or current treatments  Patient Goals  Patient goals for therapy: decreased pain, improved balance, increased motion, increased strength and return to sport/leisure activities          Objective     Palpation     Right   Tenderness of the distal biceps femoris, distal semimembranosus, distal semitendinosus, lateral gastrocnemius and medial gastrocnemius       Tenderness     Right Ankle/Foot   Tenderness in the anterior talofibular ligament and calcaneofibular ligament  No tenderness in the Achilles insertion  Active Range of Motion     Lumbar   Flexion: 55 degrees  with pain  Extension: 15 degrees  with pain  Left lateral flexion: 20 degrees    with pain  Right lateral flexion: 15 degrees  with pain  Left rotation: 25 degrees  with pain  Right rotation: 20 degrees  with pain  Left Hip   Flexion: 115 degrees     Right Hip   Flexion: 115 degrees with pain  Left Knee   Flexion: 140 degrees with pain  Extension: 0 degrees     Right Knee   Flexion: 132 degrees with pain  Extension: -7 degrees with pain  Left Ankle/Foot   Dorsiflexion (ke): 0 degrees   Plantar flexion: 65 degrees   Inversion: 45 degrees   Eversion: 25 degrees     Right Ankle/Foot   Dorsiflexion (ke): -5 degrees with pain  Plantar flexion: 70 degrees   Inversion: 33 degrees   Eversion: 5 degrees     Additional Active Range of Motion Details  Describes back pain as stiffness  Passive Range of Motion   Left Hip   External rotation (90/90): [facility]  Internal rotation (90/90): WFL    Right Hip   External rotation (90/90): 50 degrees with pain  Internal rotation (90/90): WFL    Right Knee   Extension: 0 degrees     Strength/Myotome Testing     Left Hip   Planes of Motion   Flexion: 4  Abduction: 4  Adduction: 4    Right Hip   Planes of Motion   Flexion: 3+  Abduction: 3+  Adduction: 3+    Left Knee   Flexion: 4  Extension: 4    Right Knee   Flexion: 3+  Extension: 3+    Left Ankle/Foot   Dorsiflexion: 5  Plantar flexion: 5  Inversion: 5  Eversion: 5    Right Ankle/Foot   Dorsiflexion: 4-  Plantar flexion: 4-  Inversion: 3+  Eversion: 3+    Ambulation     Observational Gait   Gait: antalgic and asymmetric   Decreased walking speed and stride length  Quality of Movement During Gait     Pelvis  Anterior pelvic tilt                Precautions none    Specialty Daily Treatment Diary       Manual 7/9       kinesiotape- stirneop, with achilles support        Manual hip ER                Exercise Diary                 90/90 ham stretch        Stand gastroc stretch        Stand soleus stretch                DBL knee to chest??        Seated p ball flex, diagonals        LTR        bridges        Sup hip abd GTB        SLS foam        Star balance        Side step squat                squats                                                Modalities

## 2019-07-16 ENCOUNTER — OFFICE VISIT (OUTPATIENT)
Dept: PHYSICAL THERAPY | Age: 34
End: 2019-07-16
Payer: COMMERCIAL

## 2019-07-16 DIAGNOSIS — M22.2X1 PATELLOFEMORAL SYNDROME OF RIGHT KNEE: ICD-10-CM

## 2019-07-16 DIAGNOSIS — M23.51 RECURRENT RIGHT KNEE INSTABILITY: Primary | ICD-10-CM

## 2019-07-16 PROCEDURE — 97110 THERAPEUTIC EXERCISES: CPT

## 2019-07-16 PROCEDURE — 97140 MANUAL THERAPY 1/> REGIONS: CPT

## 2019-07-16 NOTE — PROGRESS NOTES
Daily Note     Today's date: 2019  Patient name: Cecily Groves  : 1985  MRN: [id number]  Referring provider: Ken Gerardo DO  Dx:   Encounter Diagnosis     ICD-10-CM    1  Recurrent right knee instability M23 51    2  Patellofemoral syndrome of right knee M22 2X1                   Subjective: Reports main issues are instability and buckling of R knee and with B ankles  Also reports LBP which is better with mobility  Objective: See treatment diary below  Manual          kinesiotape- stirrup, with achilles support             Manual hip ER    2 sets of 10 ea                       Exercise Diary                            Upright LC 10min       90/90 ham stretch  [id number]           Stand gastroc stretch  06kjia4           Stand soleus stretch  66jytj7                         DBL knee to chest??  [id number]           Seated p ball flex, diagonals  NT           LTR  5sx10           bridges  20           Bridge with march 10       Sup hip abd GTB  20           SLS foam  16foob8           Star balance  NT           Side step squat  NT            Tband In/ev  green 2x15 ea           squats              Cybex TKE  2 up, 1 down 2x10 w/10lbs            Tband hip IR/ER  taught for home                                                     Modalities                                                            Assessment: Tolerated treatment well  Focused on providing pt with ex for home along with working on problem issues with B knees, ankles and LB   Will work toward more strengthening emphasis in PT and stretches for home if pt shows good home ex follow through      Plan: Cont PT per LPT plan

## 2019-07-24 ENCOUNTER — OFFICE VISIT (OUTPATIENT)
Dept: PHYSICAL THERAPY | Age: 34
End: 2019-07-24
Payer: COMMERCIAL

## 2019-07-24 DIAGNOSIS — M25.371 ANKLE INSTABILITY, RIGHT: ICD-10-CM

## 2019-07-24 DIAGNOSIS — M22.2X1 PATELLOFEMORAL SYNDROME OF RIGHT KNEE: Primary | ICD-10-CM

## 2019-07-24 DIAGNOSIS — M23.51 RECURRENT RIGHT KNEE INSTABILITY: ICD-10-CM

## 2019-07-24 DIAGNOSIS — M54.50 ACUTE MIDLINE LOW BACK PAIN WITHOUT SCIATICA: ICD-10-CM

## 2019-07-24 PROCEDURE — 97110 THERAPEUTIC EXERCISES: CPT | Performed by: PHYSICAL THERAPIST

## 2019-07-24 PROCEDURE — 97140 MANUAL THERAPY 1/> REGIONS: CPT | Performed by: PHYSICAL THERAPIST

## 2019-07-24 NOTE — PROGRESS NOTES
Daily Note     Today's date: 2019  Patient name: Magdalena Cabello  : 1985  MRN: [id number]  Referring provider: Vania Heaton DO  Dx:   Encounter Diagnosis     ICD-10-CM    1  Patellofemoral syndrome of right knee M22 2X1    2  Recurrent right knee instability M23 51    3  Acute midline low back pain without sciatica M54 5    4  Ankle instability, right M25 371                   Subjective: Patient reports ankle pain 6/10 at worse, still bothers her, no knee pain since IE  Has been doing her exercises at home  Objective: See treatment diary below  Manual        kinesiotape- stirrup, with achilles support      with eversion today       Manual hip ER    2 sets of 10 ea 2 sets of 10 IR and ER                     Exercise Diary                            Upright LC 10min  10' DC     90/90 ham stretch  [id number]    5x:20       Stand gastroc stretch  35svbw7    3x:20, R only       Stand soleus stretch  37tplg0    3x:20, R only                     DBL knee to chest  [id number]    10x:10       Seated p ball flex, diagonals  NT           LTR  5sx10    10x:05       bridges  20   20x       Bridge with march 10  10x     Sup hip abd GTB  20    20x       SLS foam  84fyhi4   3x:20       Star balance  NT   NV       Side step squat  NT    NV        Tband In/ev  green 2x15 ea     gave for HEP       squats      -        Cybex TKE  2 up, 1 down 2x10 w/10lbs   NT        Tband hip IR/ER  taught for home   Has been doing at home                                                 Modalities                                                            Assessment: Good follow through with home program  Progressed ankle TB for  Home as well  Modified taping for ankle support, pulled ankle into slight eversion with pull, taped behind achilles for support  Continued with program for hip and ankle strengthening           Plan: Cont PT per LPT plan

## 2019-08-01 ENCOUNTER — APPOINTMENT (OUTPATIENT)
Dept: PHYSICAL THERAPY | Age: 34
End: 2019-08-01
Payer: COMMERCIAL

## 2019-08-02 DIAGNOSIS — K21.9 GERD WITHOUT ESOPHAGITIS: ICD-10-CM

## 2019-08-02 RX ORDER — RANITIDINE 300 MG/1
TABLET ORAL
Qty: 30 TABLET | Refills: 0 | Status: SHIPPED | OUTPATIENT
Start: 2019-08-02 | End: 2019-09-05 | Stop reason: SDUPTHER

## 2019-08-08 ENCOUNTER — OFFICE VISIT (OUTPATIENT)
Dept: PHYSICAL THERAPY | Age: 34
End: 2019-08-08
Payer: COMMERCIAL

## 2019-08-08 DIAGNOSIS — M22.2X1 PATELLOFEMORAL SYNDROME OF RIGHT KNEE: Primary | ICD-10-CM

## 2019-08-08 PROCEDURE — 97140 MANUAL THERAPY 1/> REGIONS: CPT

## 2019-08-08 PROCEDURE — 97110 THERAPEUTIC EXERCISES: CPT

## 2019-08-08 NOTE — PROGRESS NOTES
Daily Note     Today's date: 2019  Patient name: Willard Flood  : 1985  MRN: [id number]  Referring provider: Kev Jacobson DO  Dx:   Encounter Diagnosis     ICD-10-CM    1  Patellofemoral syndrome of right knee M22 2X1                   Subjective: Patient's main c/o is R ankle pain and that is improving  Had some R hip discomfort this week but no LBP      Objective: See treatment diary below  Manual      kinesiotape- stirrup, with achilles support      with eversion today       Manual hip ER    2 sets of 10 ea 2 sets of 10 IR and ER  2x15 B                   Exercise Diary                            Upright LC 10min  10' DC     90/90 ham stretch  [id number]    5x:20       Stand gastroc stretch  24sgoo6    3x:20, R only  3x 20s R     Stand soleus stretch  32wyxf0    3x:20, R only  3x 20s R      3 way heel raises        20ea     DBL knee to chest  62rqtn03    10x:10  10sx10     Seated p ball flex, diagonals  NT           LTR  5sx10    10x:05  10x      bridges  20   20x  20x     Bridge with march &leg ext 10  10x 1 5# 15 ea    s/l hip abd  20    20x  1 5# x30     DLS prone alt UE/LE lifts    1 5# x30    SLS foam  69ufnv6   3x:20  3 x20 s     Star balance  NT   NV       Side step squat  NT    NV  red tban 5x 20ft      Tband In/ev  green 2x15 ea     gave for HEP MRE 2x20 ea     squats      -        Cybex TKE  2 up, 1 down 2x10 w/10lbs   NT        Tband hip IR/ER  taught for home   Has been doing at home                                                 Modalities                                                            Assessment: Good follow through with home program   Continued with program for hip and ankle strengthening with only fatigue noted with increased ex intensity        Plan: Cont PT per LPT plan   Going to [facility] within next two weeks for F/U

## 2019-08-12 ENCOUNTER — TELEPHONE (OUTPATIENT)
Dept: PSYCHIATRY | Facility: CLINIC | Age: 34
End: 2019-08-12

## 2019-08-12 NOTE — TELEPHONE ENCOUNTER
Patient requested you fill out a form for medication verification for her new job  She will drop off form and sign a Release at that time  Lorne Taveras

## 2019-08-13 ENCOUNTER — APPOINTMENT (OUTPATIENT)
Dept: PHYSICAL THERAPY | Age: 34
End: 2019-08-13
Payer: COMMERCIAL

## 2019-08-13 ENCOUNTER — DOCUMENTATION (OUTPATIENT)
Dept: PSYCHIATRY | Facility: CLINIC | Age: 34
End: 2019-08-13

## 2019-08-13 NOTE — PROGRESS NOTES
Treatment Plan not completed within required time limits due to : Provider will be out of the office   and will reschedule at a later time

## 2019-08-23 ENCOUNTER — OFFICE VISIT (OUTPATIENT)
Dept: PHYSICAL THERAPY | Age: 34
End: 2019-08-23
Payer: COMMERCIAL

## 2019-08-23 DIAGNOSIS — M22.2X1 PATELLOFEMORAL SYNDROME OF RIGHT KNEE: Primary | ICD-10-CM

## 2019-08-23 DIAGNOSIS — M54.50 ACUTE MIDLINE LOW BACK PAIN WITHOUT SCIATICA: ICD-10-CM

## 2019-08-23 DIAGNOSIS — M23.51 RECURRENT RIGHT KNEE INSTABILITY: ICD-10-CM

## 2019-08-23 DIAGNOSIS — M25.371 ANKLE INSTABILITY, RIGHT: ICD-10-CM

## 2019-08-23 PROCEDURE — 97110 THERAPEUTIC EXERCISES: CPT | Performed by: PHYSICAL THERAPIST

## 2019-08-23 NOTE — PROGRESS NOTES
PT Discharge    Today's date: 2019  Patient name: Sabina Alatorre  : 1985  MRN: [id number]  Referring provider: Norman Lizama DO  Dx:   Encounter Diagnosis     ICD-10-CM    1  Patellofemoral syndrome of right knee M22 2X1    2  Recurrent right knee instability M23 51    3  Acute midline low back pain without sciatica M54 5    4  Ankle instability, right M25 371                   Assessment  Assessment details: Patient seen for brief re-assessment  Discussed exercising with home program  Patient has returned to karate without hip, knee and ankle pain  Recommended to continue with hip strengthening program, to stretch her back and to continue practicing her balance exercises  Recommended patient to follow up with PT if needed for further help/assistance  Functional limitations: Patient reports no limitations with IADLs, standing, walking tolerance, pain with stairs and recreational activities (ie karate)Understanding of Dx/Px/POC: good   Prognosis: good    Goals  Impairment Goals to be met within 4 weeks  - Decrease pain to 0/10 at rest and with activity met  - Improve ROM by 5-10 degrees lumbar spine met  - Improve ROM by 5-10 degrees R ankle met  - Increase strength to 5/5 throughout met  - Patient to be able to sustain balance x 15 sec foam surface  met    Functional Goals to be met within 4-6 weeks     - Return to Prior Level of Function met  - Increase Functional Status Measure to: expected met  - Patient will be independent with HEP met  - Patient to be able to tolerate karate with pain < 2/10 in R knee  met      Plan  Plan details: Discharge PT  Patient would benefit from: skilled physical therapy  Planned modality interventions: cryotherapy and thermotherapy: hydrocollator packs  Planned therapy interventions: abdominal trunk stabilization, joint mobilization, manual therapy, neuromuscular re-education, patient education, postural training, strengthening, stretching, therapeutic activities, therapeutic exercise, home exercise program, graded activity, gait training and balance  Treatment plan discussed with: patient        Subjective Evaluation    History of Present Illness  Mechanism of injury: Patient feeling ready to be discharged to [facility]  Feels no knee, hip nor ankle pain  Has no pain with karate  Pain  Current pain ratin  At best pain ratin  At worst pain ratin  Location: posterior knee joint- previous pain pain 9/10 x ~ 2-3 weeks ago;  ankle pain at worse- /10 (x 2-3 weeks ago)  Quality: dull ache and sharp  Aggravating factors: walking, stair climbing and running  Progression: improved    Social Support    Employment status: working    Diagnostic Tests  No diagnostic tests performed  X-ray: normal (x ray for ankle negative)  Treatments  No previous or current treatments  Patient Goals  Patient goals for therapy: decreased pain, improved balance, increased motion, increased strength and return to sport/leisure activities          Objective     Tenderness     Right Ankle/Foot   No tenderness in the Achilles insertion  Active Range of Motion     Lumbar   Flexion: 65 degrees   Extension: 20 degrees   Left lateral flexion: 20 degrees       Right lateral flexion: 20 degrees   Left rotation: 25 degrees   Right rotation: 25 degrees   Left Hip   Flexion: 115 degrees     Right Hip   Flexion: 115 degrees with pain  Left Knee   Flexion: 140 degrees with pain  Extension: 0 degrees     Right Knee   Flexion: 132 degrees with pain  Extension: -7 degrees with pain  Left Ankle/Foot   Dorsiflexion (ke): 0 degrees   Plantar flexion: 65 degrees   Inversion: 45 degrees   Eversion: 25 degrees     Right Ankle/Foot   Dorsiflexion (ke): -5 degrees with pain  Plantar flexion: 70 degrees   Inversion: 33 degrees   Eversion: 5 degrees     Additional Active Range of Motion Details  Describes back pain as stiffness  Passive Range of Motion   Left Hip   External rotation (90/90):  [facility] rotation (90/90): WFL    Right Hip   External rotation (90/90): 50 degrees with pain  Internal rotation (90/90): WFL    Right Knee   Extension: 0 degrees     Strength/Myotome Testing     Left Hip   Planes of Motion   Flexion: 4+  Abduction: 4+  Adduction: 4+    Right Hip   Planes of Motion   Flexion: 4+  Abduction: 4+  Adduction: 4+    Left Knee   Flexion: 4  Extension: 4    Right Knee   Flexion: 3+  Extension: 3+    Left Ankle/Foot   Dorsiflexion: 5  Plantar flexion: 5  Inversion: 5  Eversion: 5    Right Ankle/Foot   Dorsiflexion: 4-  Plantar flexion: 4-  Inversion: 3+  Eversion: 3+    Ambulation     Observational Gait   Gait: antalgic and asymmetric   Decreased walking speed and stride length  Quality of Movement During Gait     Pelvis  Anterior pelvic tilt                Objective: See treatment diary below  Manual 7/9 7/16 7/22 8/8     kinesiotape- stirrup, with achilles support      with eversion today       Manual hip ER    2 sets of 10 ea 2 sets of 10 IR and ER  2x15 B                   Exercise Diary                 7/16           Upright LC 10min  10' DC     90/90 ham stretch  74htev4    5x:20       Stand gastroc stretch  60oyke4    3x:20, R only  3x 20s R     Stand soleus stretch  82pboe2    3x:20, R only  3x 20s R      3 way heel raises        20ea     DBL knee to chest  [id number]    10x:10  10sx10     Seated p ball flex, diagonals  NT           LTR  5sx10    10x:05  10x      bridges  20   20x  20x     Bridge with march &leg ext 10  10x 1 5# 15 ea    s/l hip abd  20    20x  1 5# x30     DLS prone alt UE/LE lifts    1 5# x30    SLS foam  60tawn7   3x:20  3 x20 s     Star balance  NT   NV       Side step squat  NT    NV  red tban 5x 20ft      Tband In/ev  green 2x15 ea     gave for HEP MRE 2x20 ea     squats      -        Cybex TKE  2 up, 1 down 2x10 w/10lbs   NT        Tband hip IR/ER  taught for home   Has been doing at home                                                 Modalities

## 2019-09-05 ENCOUNTER — OFFICE VISIT (OUTPATIENT)
Dept: OBGYN CLINIC | Facility: MEDICAL CENTER | Age: 34
End: 2019-09-05
Payer: COMMERCIAL

## 2019-09-05 VITALS
BODY MASS INDEX: 22.99 KG/M2 | DIASTOLIC BLOOD PRESSURE: 79 MMHG | SYSTOLIC BLOOD PRESSURE: 118 MMHG | HEIGHT: 65 IN | WEIGHT: 138 LBS | HEART RATE: 67 BPM

## 2019-09-05 DIAGNOSIS — M22.2X1 PATELLOFEMORAL SYNDROME OF RIGHT KNEE: Primary | ICD-10-CM

## 2019-09-05 DIAGNOSIS — K21.9 GERD WITHOUT ESOPHAGITIS: ICD-10-CM

## 2019-09-05 DIAGNOSIS — M25.371 RIGHT ANKLE INSTABILITY: ICD-10-CM

## 2019-09-05 PROCEDURE — 99213 OFFICE O/P EST LOW 20 MIN: CPT | Performed by: ORTHOPAEDIC SURGERY

## 2019-09-05 RX ORDER — FLUCONAZOLE 150 MG/1
TABLET ORAL
COMMUNITY
Start: 2019-08-30 | End: 2019-12-27 | Stop reason: ALTCHOICE

## 2019-09-05 RX ORDER — RANITIDINE 300 MG/1
300 TABLET ORAL
Qty: 90 TABLET | Refills: 1 | Status: SHIPPED | OUTPATIENT
Start: 2019-09-05 | End: 2020-08-24

## 2019-09-05 RX ORDER — RANITIDINE 300 MG/1
300 TABLET ORAL
Qty: 90 TABLET | Refills: 1 | OUTPATIENT
Start: 2019-09-05

## 2019-09-05 RX ORDER — AMOXICILLIN AND CLAVULANATE POTASSIUM 875; 125 MG/1; MG/1
TABLET, FILM COATED ORAL
COMMUNITY
Start: 2019-08-30 | End: 2019-12-27 | Stop reason: ALTCHOICE

## 2019-09-05 RX ORDER — RANITIDINE 300 MG/1
TABLET ORAL
Qty: 30 TABLET | Refills: 0 | OUTPATIENT
Start: 2019-09-05

## 2019-09-05 NOTE — PROGRESS NOTES
Ortho Sports Medicine Knee Visit     Assesment:   right knee patellofemoral syndrome no significantly improved after physical therapy  Right ankle instability, history chronic ankle sprains     Plan:    Conservative treatment:    Ice to knee for 20 minutes at least 1-2 times daily  OTC NSAIDS prn for pain  Let pain guide gradual return activities  Discussed well supportive foot wear, maintain HEP for ankle strengthening/stability  Imaging: All imaging from today was reviewed by myself and explained to the patient  MRI of right ankle will be obtained to better determine treatment for ankle instability  Injection:    No Injection planned at this time  Surgery:     No surgery is recommended at this point, continue with conservative treatment plan as noted  History of Present Illness: The patient is returns for follow up of her right knee, treating for PFS  She has attended physical therapy with benefit  Since the prior visit, She reports significant improvement  She is maintaining HEP, she has returned to all activities  She is only having mild anterior knee pain    Pain is improved by rest, ice, NSAIDS and physical therapy  Symptoms include clicking  The patient has tried rest, ice, NSAIDS and physical therapy  She has history chronic ankle sprains, still some swelling lateral malleolus , therapy did address and give HEP  Her ankle will still role inward just with normal walking  I have reviewed the past medical, surgical, social and family history, medications and allergies as documented in the EMR  Review of systems: ROS is negative other than that noted in the HPI  Constitutional: Negative for fatigue and fever  Physical Exam:    Height 5' 5" (1 651 m), weight 62 6 kg (138 lb)      General/Constitutional: NAD, well developed, well nourished  HENT: Normocephalic, atraumatic  CV: Intact distal pulses, regular rate  Resp: No respiratory distress or labored breathing  Lymphatic: No lymphadenopathy palpated  Neuro: Alert and Oriented x 3, no focal deficits  Psych: Normal mood, normal affect, normal judgement, normal behavior  Skin: Warm, dry, no rashes, no erythema       Knee Exam (focused):                  RIGHT LEFT   ROM:   0-130 0-130   Palpation: Effusion negative negative     MJL tenderness Negative Negative     LJL tenderness Negative Negative   Instability: Varus stable stable     Valgus stable stable   Special Tests: Lachman Negative Negative     Posterior drawer Negative Negative     Anterior drawer Negative Negative     Pivot shift not tested not tested     Dial not tested not tested   Patella: Palpation no tenderness no tenderness     Mobility 1/4 1/4     Apprehension Negative Negative   Other: Single leg 1/4 squat not tested not tested      Right ankle:  Tenderness ATFL, swelling lateral malleolus   1+ anterior drawer right ankle  Full range of motion in all planes  Dorsiflexion and plantarflexion 5/5   Eversion -5/5      LE NV Exam: +2 DP/PT pulses bilaterally  Sensation intact to light touch L2-S1 bilaterally    No calf tenderness to palpation bilaterally      Knee Imaging    No imaging was performed today      Scribe Attestation    I,:   Catrina Singh am acting as a scribe while in the presence of the attending physician :        I,:   Geovanny Garcia DO personally performed the services described in this documentation    as scribed in my presence :

## 2019-09-14 ENCOUNTER — HOSPITAL ENCOUNTER (OUTPATIENT)
Dept: RADIOLOGY | Facility: HOSPITAL | Age: 34
Discharge: HOME | End: 2019-09-14
Attending: ORTHOPAEDIC SURGERY
Payer: COMMERCIAL

## 2019-09-14 DIAGNOSIS — M25.371 RIGHT ANKLE INSTABILITY: ICD-10-CM

## 2019-09-14 PROCEDURE — 73721 MRI JNT OF LWR EXTRE W/O DYE: CPT

## 2019-09-23 ENCOUNTER — OFFICE VISIT (OUTPATIENT)
Dept: OBGYN CLINIC | Facility: MEDICAL CENTER | Age: 34
End: 2019-09-23
Payer: COMMERCIAL

## 2019-09-23 VITALS
HEIGHT: 65 IN | HEART RATE: 60 BPM | SYSTOLIC BLOOD PRESSURE: 122 MMHG | DIASTOLIC BLOOD PRESSURE: 76 MMHG | BODY MASS INDEX: 23.82 KG/M2 | WEIGHT: 143 LBS

## 2019-09-23 DIAGNOSIS — M25.371 ANKLE INSTABILITY, RIGHT: Primary | ICD-10-CM

## 2019-09-23 DIAGNOSIS — F33.2 MDD (MAJOR DEPRESSIVE DISORDER), RECURRENT SEVERE, WITHOUT PSYCHOSIS (HCC): ICD-10-CM

## 2019-09-23 PROCEDURE — 99213 OFFICE O/P EST LOW 20 MIN: CPT | Performed by: ORTHOPAEDIC SURGERY

## 2019-09-23 RX ORDER — CITALOPRAM 20 MG/1
TABLET ORAL
Qty: 90 TABLET | Refills: 4 | Status: SHIPPED | OUTPATIENT
Start: 2019-09-23 | End: 2020-06-29 | Stop reason: SDUPTHER

## 2019-09-23 NOTE — PROGRESS NOTES
Ortho Sports Medicine Follow-Up Ankle Visit    Assesment:  Chronic right ankle instability evident on MRI with thickened ATFL and CF ligament  Patient is doing well at this time  Plan:    1  Referral to Dr Dequan Bowman  2  Advised to continue HEP and advil and ice as needed    Follow up: Return if symptoms worsen or fail to improve  Chief Complaint   Patient presents with    Right Ankle - Follow-up       History of Present Illness: The patient is returns for follow up of right ankle  Since the prior visit, She reports minimal improvement  She stated that she still experiences ankle instability with walking, stating it will give out on her  The patient sustained many ankle injuries over the years she states the last time she invert her ankle was in January 2019  The mechanism of injury was invesion  The pain is characterized as dull, achy  The pain is present daily  At Today's visit, the pain is located posterior and lateral     Pain is improved by rest and ice  Pain is aggravated by inversion or standing on uneven surfaces  Symptoms include clicking and swelling  The patient has tried rest, ice, NSAIDS and physical therapy  The patient states that she has weakness and that causes her ankle to give out  The patient denies numbness and tingling  I have reviewed the past medical, surgical, social and family history, medications and allergies as documented in the EMR        Ankle Surgical History:  None      Past Medical, Social and Family History:  Past Medical History:   Diagnosis Date    Urinary tract infection      Past Surgical History:   Procedure Laterality Date    COLONOSCOPY      ESOPHAGOGASTRODUODENOSCOPY  02/17/2012    Diagnostic; Dr Domenic Damon, chronic gastritis    SKIN BIOPSY      TOOTH EXTRACTION       Allergies   Allergen Reactions    Doxycycline GI Intolerance     Current Outpatient Medications on File Prior to Visit   Medication Sig Dispense Refill    citalopram (CeleXA) 20 mg tablet TAKE 1 TABLET DAILY 90 tablet 0    clonazePAM (KlonoPIN) 1 mg tablet Take 1 tablet (1 mg total) by mouth daily at bedtime 30 tablet 2    hyoscyamine (LEVSIN) 0 125 MG/5ML ELIX Place 1 tablet under the tongue 3 (three) times a day as needed      ranitidine (ZANTAC) 300 MG tablet Take 1 tablet (300 mg total) by mouth daily at bedtime 90 tablet 1    rizatriptan (MAXALT-MLT) 10 MG disintegrating tablet Take 1 tablet by mouth as needed      amoxicillin-clavulanate (AUGMENTIN) 875-125 mg per tablet       fluconazole (DIFLUCAN) 150 mg tablet        No current facility-administered medications on file prior to visit        Social History     Socioeconomic History    Marital status: /Civil Union     Spouse name: Not on file    Number of children: Not on file    Years of education: Not on file    Highest education level: Not on file   Occupational History    Not on file   Social Needs    Financial resource strain: Not on file    Food insecurity:     Worry: Not on file     Inability: Not on file    Transportation needs:     Medical: Not on file     Non-medical: Not on file   Tobacco Use    Smoking status: Former Smoker     Last attempt to quit: 2011     Years since quittin 3    Smokeless tobacco: Never Used   Substance and Sexual Activity    Alcohol use: Yes     Comment: Social drinker    Drug use: No    Sexual activity: Yes     Partners: Female     Comment: Birth control not practiced   Lifestyle    Physical activity:     Days per week: Not on file     Minutes per session: Not on file    Stress: Not on file   Relationships    Social connections:     Talks on phone: Not on file     Gets together: Not on file     Attends Mandaeism service: Not on file     Active member of club or organization: Not on file     Attends meetings of clubs or organizations: Not on file     Relationship status: Not on file    Intimate partner violence:     Fear of current or ex partner: Not on file     Emotionally abused: Not on file     Physically abused: Not on file     Forced sexual activity: Not on file   Other Topics Concern    Not on file   Social History Narrative    Caffeine use    Daily coffee consumption (1 cup/day)    Denied:  History of     Baptism affiliation:  None    Uses safety equipment - seatbelts         I have reviewed the past medical, surgical, social and family history, medications and allergies as documented in the EMR  Review of systems: ROS is negative other than that noted in the HPI  Constitutional: Negative for fatigue and fever  HENT: Negative for sore throat  Respiratory: Negative for shortness of breath  Cardiovascular: Negative for chest pain  Gastrointestinal: Negative for abdominal pain  Endocrine: Negative for cold intolerance and heat intolerance  Genitourinary: Negative for flank pain  Musculoskeletal: Negative for back pain  Skin: Negative for rash  Allergic/Immunologic: Negative for immunocompromised state  Neurological: Negative for dizziness  Psychiatric/Behavioral: Negative for agitation  Physical Exam:    Blood pressure 122/76, pulse 60, height 5' 5" (1 651 m), weight 64 9 kg (143 lb)  General/Constitutional: NAD, well developed, well nourished  HENT: Normocephalic, atraumatic  CV: Intact distal pulses, regular rate  Resp: No respiratory distress or labored breathing  Lymphatic: No lymphadenopathy palpated  Neuro: Alert and Oriented x 3, no focal deficits  Psych: Normal mood, normal affect, normal judgement, normal behavior  Skin: Warm, dry, no rashes, no erythema       Ankle Examination (focused):     RIGHT LEFT   ROM:  Full Full   Palpation:  ttp CF, ttp ATFL Negative   Anterior Drawer positive negative   Calcaneal inversion positive negative   Squeeze Test negative negative       No subluxation of the peroneal tendons or tenderness to palpation along the peroneal tendons     No pain with palpation or range of motion of midfoot and forefoot bilaterally    No limp upon gait exam    No calf tenderness to palpation bilaterally    LE NV Exam: +2 DP/PT pulses bilaterally  Sensation intact to light touch L2-S1 bilaterally        Ankle Imaging:    X-rays of the right foot/ankle taken on 6/24/19, which demonstrate no acute fractures or dislocations and no osseous abnormalities  I have reviewed the radiology report and agree with their impression  MRI of the right foot/ankle taken on 9/14/19 were reviewed, which demonstrate mild peroneal tenosynovitis without a tear  It also shows thickening of the CF and ATFL  I have reviewed the radiology report and agree with their impression      Roe Bashir PA-C

## 2019-10-01 ENCOUNTER — DOCUMENTATION (OUTPATIENT)
Dept: BEHAVIORAL/MENTAL HEALTH CLINIC | Facility: CLINIC | Age: 34
End: 2019-10-01

## 2019-10-01 NOTE — PROGRESS NOTES
Treatment Plan not completed within required time limits due to : Provider will be out of the office 8/16/2019 unable to see at this time slot  and will reschedule at a later time    Supervised by Vera Henry MA

## 2019-10-03 ENCOUNTER — ANNUAL EXAM (OUTPATIENT)
Dept: OBGYN CLINIC | Facility: CLINIC | Age: 34
End: 2019-10-03
Payer: COMMERCIAL

## 2019-10-03 VITALS
SYSTOLIC BLOOD PRESSURE: 120 MMHG | BODY MASS INDEX: 23.16 KG/M2 | DIASTOLIC BLOOD PRESSURE: 82 MMHG | WEIGHT: 139 LBS | HEIGHT: 65 IN

## 2019-10-03 DIAGNOSIS — Z01.419 ENCOUNTER FOR ANNUAL ROUTINE GYNECOLOGICAL EXAMINATION: Primary | ICD-10-CM

## 2019-10-03 PROCEDURE — S0612 ANNUAL GYNECOLOGICAL EXAMINA: HCPCS | Performed by: OBSTETRICS & GYNECOLOGY

## 2019-10-03 NOTE — PROGRESS NOTES
Assessment/Plan:    pap is up to date    Discussed self breast exams    Dysmenorrhea-she will continue using ibuprofen as needed, she also sometimes uses a heating pad  Overall she feels that they are not quite as bad as they were a few years ago  discussed preventive care, regular exercise and a healthy diet    No problem-specific Assessment & Plan notes found for this encounter  Diagnoses and all orders for this visit:    Encounter for annual routine gynecological examination    Encounter for screening mammogram for breast cancer          Subjective:      Patient ID: Marcos Nath is a 30 y o  female  Patient here for yearly  She has regular menstrual cycles with cramps that started 3 days before her bleeding starts and last up to the 3rd day of her cycle  She takes ibuprofen for this, twice on the 1st day and usually just wants after that  She takes 600 mg of over-the-counter ibuprofen  This works well for her  Her migraines are under fairly good control  She does have irritable bowel syndrome which is often symptomatic  Normal Pap and negative HPV in 2018  The following portions of the patient's history were reviewed and updated as appropriate: allergies, current medications, past family history, past medical history, past social history, past surgical history and problem list     Review of Systems   Constitutional: Negative  Gastrointestinal: Negative  Genitourinary: Negative  Objective:      /82 (BP Location: Left arm, Patient Position: Sitting, Cuff Size: Standard)   Ht 5' 5" (1 651 m)   Wt 63 kg (139 lb)   LMP 09/22/2019   BMI 23 13 kg/m²          Physical Exam   Constitutional: She appears well-developed  Neck: No tracheal deviation present  No thyromegaly present  Cardiovascular: Normal rate and regular rhythm     Pulmonary/Chest: Effort normal and breath sounds normal  Right breast exhibits no inverted nipple, no mass, no nipple discharge, no skin change and no tenderness  Left breast exhibits no inverted nipple, no mass, no nipple discharge, no skin change and no tenderness  Breasts are symmetrical    Examined seated and supine   Abdominal: Soft  She exhibits no distension and no mass  There is no tenderness  Genitourinary: Vagina normal and uterus normal  No labial fusion  There is no rash, tenderness, lesion or injury on the right labia  There is no rash, tenderness, lesion or injury on the left labia  Cervix exhibits no motion tenderness, no discharge and no friability  Right adnexum displays no mass, no tenderness and no fullness  Left adnexum displays no mass, no tenderness and no fullness  Vitals reviewed

## 2019-10-07 ENCOUNTER — DOCUMENTATION (OUTPATIENT)
Dept: PSYCHIATRY | Facility: CLINIC | Age: 34
End: 2019-10-07

## 2019-10-07 NOTE — PROGRESS NOTES
Treatment Plan not completed within required time limits due to: Vinayak Sandoval  cancelled appointment  on 10/10/2019(started a new job and needed to reschedule from 10/10/19 to 12/27/19 )

## 2019-12-27 ENCOUNTER — OFFICE VISIT (OUTPATIENT)
Dept: PSYCHIATRY | Facility: CLINIC | Age: 34
End: 2019-12-27
Payer: COMMERCIAL

## 2019-12-27 DIAGNOSIS — F41.1 GAD (GENERALIZED ANXIETY DISORDER): ICD-10-CM

## 2019-12-27 PROCEDURE — 99213 OFFICE O/P EST LOW 20 MIN: CPT | Performed by: PSYCHIATRY & NEUROLOGY

## 2019-12-27 RX ORDER — CLONAZEPAM 1 MG/1
1 TABLET ORAL
Qty: 90 TABLET | Refills: 0 | Status: SHIPPED | OUTPATIENT
Start: 2019-12-27 | End: 2020-06-29 | Stop reason: SDUPTHER

## 2019-12-27 NOTE — PSYCH
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  She will like to eventually taper off Celexa but stated will wait longer to decrease to 10 mg and eventually stop  Agrees to f/u in 6 months  Review Of Systems:     Mood Normal   Behavior Normal    Thought Content Normal   General Normal    Personality Normal   Other Psych Symptoms Normal   Constitutional Negative   ENT Negative   Cardiovascular Negative   Respiratory Negative   Gastrointestinal Negative   Genitourinary Negative   Musculoskeletal Negative   Integumentary Negative   Neurological Negative   Endocrine Normal    Other Symptoms Normal              Laboratory Results: No results found for this or any previous visit      Substance Abuse History:  Social History     Substance and Sexual Activity   Drug Use No       Family Psychiatric History:   Family History   Problem Relation Age of Onset    Hypothyroidism Mother     Other Maternal Grandmother         Thyroid disorder    Colon cancer Paternal Grandmother     Other Paternal Grandmother         Thyroid disorder    Colon cancer Paternal Grandfather     Other Paternal Grandfather         Thyroid disorder    Other Maternal Aunt         Thyroid disorder    Other Paternal Aunt         Thyroid disorder    Other Maternal Uncle         Thyroid disorder    Colon cancer Paternal Uncle     Crohn's disease Paternal Uncle     Other Paternal Uncle         Thyroid disorder    Other Family         Benign polyps of the large intestine    No Known Problems Father        The following portions of the patient's history were reviewed and updated as appropriate: allergies, current medications, past family history, past medical history, past social history, past surgical history and problem list     Social History     Socioeconomic History    Marital status: /Civil Union     Spouse name: Not on file    Number of children: Not on file    Years of education: Not on file    Highest education level: Not on file   Occupational History    Not on file   Social Needs    Financial resource strain: Not on file    Food insecurity:     Worry: Not on file     Inability: Not on file    Transportation needs:     Medical: Not on file     Non-medical: Not on file   Tobacco Use    Smoking status: Former Smoker     Last attempt to quit: 2011     Years since quittin 5    Smokeless tobacco: Never Used   Substance and Sexual Activity    Alcohol use: Yes     Comment: Social drinker    Drug use: No    Sexual activity: Yes     Partners: Female   Lifestyle    Physical activity:     Days per week: Not on file     Minutes per session: Not on file    Stress: Not on file   Relationships    Social connections:     Talks on phone: Not on file     Gets together: Not on file     Attends Roman Catholic service: Not on file     Active member of club or organization: Not on file     Attends meetings of clubs or organizations: Not on file     Relationship status: Not on file    Intimate partner violence:     Fear of current or ex partner: Not on file     Emotionally abused: Not on file     Physically abused: Not on file     Forced sexual activity: Not on file   Other Topics Concern    Not on file   Social History Narrative    Caffeine use    Daily coffee consumption (1 cup/day)    Denied:  History of     Nondenominational affiliation:  None    Uses safety equipment - seatbelts     Social History     Social History Narrative    Caffeine use    Daily coffee consumption (1 cup/day)    Denied:  History of     Nondenominational affiliation:  None    Uses safety equipment - seatbelts       Objective:       Mental status:  Appearance calm and cooperative  and good eye contact    Mood euthymic   Affect affect was constricted   Speech a normal rate and fluent   Thought Processes coherent/organized and normal thought processes   Hallucinations no hallucinations present    Thought Content no delusions   Abnormal Thoughts no suicidal thoughts  and no homicidal thoughts    Orientation  oriented to person and place and time   Remote Memory short term memory intact and long term memory intact   Attention Span concentration impaired   Intellect Appears to be of Average Intelligence   Insight Limited insight   Judgement judgment was limited   Muscle Strength Muscle strength and tone were normal and Normal gait    Language no difficulty naming common objects, no difficulty repeating a phrase  and no difficulty writing a sentence    Fund of Knowledge displays adequate knowledge of current events, adequate fund of knowledge regarding past history and adequate fund of knowledge regarding vocabulary    Pain none   Pain Scale 0       Assessment/Plan:       Diagnoses and all orders for this visit:    SHARA (generalized anxiety disorder)  -     clonazePAM (KlonoPIN) 1 mg tablet;  Take 1 tablet (1 mg total) by mouth daily at bedtime            Treatment Recommendations- Risks Benefits      Immediate Medical/Psychiatric/Psychotherapy Treatments and Any Precautions: continue current medications     Risks, Benefits And Possible Side Effects Of Medications:  {PSYCH RISK, BENEFITS AND POSSIBLE SIDE EFFECTS (Optional):92184    Controlled Medication Discussion:

## 2019-12-27 NOTE — BH TREATMENT PLAN
TREATMENT PLAN (Medication Management Only)        [facility]    Name and Date of Birth:  Maricruz Gamble 30 y o  1985  Date of Treatment Plan: December 27, 2019  Diagnosis/Diagnoses:    1  SHARA (generalized anxiety disorder)      Strengths/Personal Resources for Self-Care: taking medications as prescribed, ability to communicate needs, motivation for treatment  Area/Areas of need (in own words): "impulssivity"  1  Long Term Goal: continue improvement in mood stability  Target Date: 2 months - 2/27/2020  Person/Persons responsible for completion of goal: Cherish  2  Short Term Objective (s) - How will we reach this goal?:   A  Provider new recommended medication/dosage changes and/or continue medication(s): continue current medications as prescribed  B  plan to decrese Celexa to 10 mg to eventually taper off  C  N/A  Target Date: 6 months - 6/27/2020  Person/Persons Responsible for Completion of Goal: Cherish  Progress Towards Goals: continuing treatment  Treatment Modality: medication management every 6 month  Review due 90 to 120 days from date of this plan: 6 months  Expected length of service: maintenance  My Physician/PA/NP and I have developed this plan together and I agree to work on the goals and objectives  I understand the treatment goals that were developed for my treatment

## 2020-03-11 DIAGNOSIS — K21.9 GERD WITHOUT ESOPHAGITIS: ICD-10-CM

## 2020-03-11 RX ORDER — RANITIDINE 300 MG/1
TABLET ORAL
Qty: 90 TABLET | Refills: 3 | OUTPATIENT
Start: 2020-03-11

## 2020-04-13 ENCOUNTER — TELEMEDICINE (OUTPATIENT)
Dept: FAMILY MEDICINE CLINIC | Facility: CLINIC | Age: 35
End: 2020-04-13
Payer: COMMERCIAL

## 2020-04-13 DIAGNOSIS — K21.9 GERD WITHOUT ESOPHAGITIS: Primary | ICD-10-CM

## 2020-04-13 DIAGNOSIS — F41.1 GAD (GENERALIZED ANXIETY DISORDER): ICD-10-CM

## 2020-04-13 DIAGNOSIS — R23.8 EASY BRUISING: ICD-10-CM

## 2020-04-13 PROBLEM — R23.3 EASY BRUISING: Status: ACTIVE | Noted: 2020-04-13

## 2020-04-13 PROBLEM — N91.2 AMENORRHEA: Status: RESOLVED | Noted: 2017-01-23 | Resolved: 2020-04-13

## 2020-04-13 PROCEDURE — 99213 OFFICE O/P EST LOW 20 MIN: CPT | Performed by: FAMILY MEDICINE

## 2020-06-11 ENCOUNTER — APPOINTMENT (OUTPATIENT)
Dept: LAB | Age: 35
End: 2020-06-11
Payer: COMMERCIAL

## 2020-06-11 ENCOUNTER — TELEPHONE (OUTPATIENT)
Dept: FAMILY MEDICINE CLINIC | Facility: CLINIC | Age: 35
End: 2020-06-11

## 2020-06-11 DIAGNOSIS — R23.8 EASY BRUISING: ICD-10-CM

## 2020-06-11 LAB
APTT PPP: 27 SECONDS (ref 23–37)
BASOPHILS # BLD AUTO: 0.04 THOUSANDS/ΜL (ref 0–0.1)
BASOPHILS NFR BLD AUTO: 1 % (ref 0–1)
EOSINOPHIL # BLD AUTO: 0.06 THOUSAND/ΜL (ref 0–0.61)
EOSINOPHIL NFR BLD AUTO: 1 % (ref 0–6)
ERYTHROCYTE [DISTWIDTH] IN BLOOD BY AUTOMATED COUNT: 12.5 % (ref 11.6–15.1)
HCT VFR BLD AUTO: 39.2 % (ref 34.8–46.1)
HGB BLD-MCNC: 12.8 G/DL (ref 11.5–15.4)
IMM GRANULOCYTES # BLD AUTO: 0.01 THOUSAND/UL (ref 0–0.2)
IMM GRANULOCYTES NFR BLD AUTO: 0 % (ref 0–2)
INR PPP: 0.9 (ref 0.84–1.19)
LYMPHOCYTES # BLD AUTO: 1.48 THOUSANDS/ΜL (ref 0.6–4.47)
LYMPHOCYTES NFR BLD AUTO: 29 % (ref 14–44)
MCH RBC QN AUTO: 31.1 PG (ref 26.8–34.3)
MCHC RBC AUTO-ENTMCNC: 32.7 G/DL (ref 31.4–37.4)
MCV RBC AUTO: 95 FL (ref 82–98)
MONOCYTES # BLD AUTO: 0.44 THOUSAND/ΜL (ref 0.17–1.22)
MONOCYTES NFR BLD AUTO: 9 % (ref 4–12)
NEUTROPHILS # BLD AUTO: 3.05 THOUSANDS/ΜL (ref 1.85–7.62)
NEUTS SEG NFR BLD AUTO: 60 % (ref 43–75)
NRBC BLD AUTO-RTO: 0 /100 WBCS
PLATELET # BLD AUTO: 209 THOUSANDS/UL (ref 149–390)
PMV BLD AUTO: 10.5 FL (ref 8.9–12.7)
PROTHROMBIN TIME: 11.8 SECONDS (ref 11.6–14.5)
RBC # BLD AUTO: 4.11 MILLION/UL (ref 3.81–5.12)
WBC # BLD AUTO: 5.08 THOUSAND/UL (ref 4.31–10.16)

## 2020-06-11 PROCEDURE — 85730 THROMBOPLASTIN TIME PARTIAL: CPT

## 2020-06-11 PROCEDURE — 85025 COMPLETE CBC W/AUTO DIFF WBC: CPT | Performed by: FAMILY MEDICINE

## 2020-06-11 PROCEDURE — 85610 PROTHROMBIN TIME: CPT

## 2020-06-11 PROCEDURE — 36415 COLL VENOUS BLD VENIPUNCTURE: CPT | Performed by: FAMILY MEDICINE

## 2020-06-19 ENCOUNTER — DOCUMENTATION (OUTPATIENT)
Dept: PSYCHIATRY | Facility: CLINIC | Age: 35
End: 2020-06-19

## 2020-06-29 DIAGNOSIS — F33.2 MDD (MAJOR DEPRESSIVE DISORDER), RECURRENT SEVERE, WITHOUT PSYCHOSIS (HCC): ICD-10-CM

## 2020-06-29 DIAGNOSIS — F41.1 GAD (GENERALIZED ANXIETY DISORDER): ICD-10-CM

## 2020-06-29 RX ORDER — CLONAZEPAM 1 MG/1
1 TABLET ORAL
Qty: 90 TABLET | Refills: 0 | Status: CANCELLED | OUTPATIENT
Start: 2020-06-29

## 2020-06-29 RX ORDER — CITALOPRAM 20 MG/1
20 TABLET ORAL DAILY
Qty: 90 TABLET | Refills: 4 | Status: CANCELLED | OUTPATIENT
Start: 2020-06-29

## 2020-08-24 ENCOUNTER — TELEMEDICINE (OUTPATIENT)
Dept: FAMILY MEDICINE CLINIC | Facility: CLINIC | Age: 35
End: 2020-08-24
Payer: COMMERCIAL

## 2020-08-24 DIAGNOSIS — Z11.59 ENCOUNTER FOR SCREENING FOR OTHER VIRAL DISEASES: Primary | ICD-10-CM

## 2020-08-24 DIAGNOSIS — Z11.59 ENCOUNTER FOR SCREENING FOR OTHER VIRAL DISEASES: ICD-10-CM

## 2020-08-24 PROCEDURE — 99214 OFFICE O/P EST MOD 30 MIN: CPT | Performed by: FAMILY MEDICINE

## 2020-08-24 PROCEDURE — U0003 INFECTIOUS AGENT DETECTION BY NUCLEIC ACID (DNA OR RNA); SEVERE ACUTE RESPIRATORY SYNDROME CORONAVIRUS 2 (SARS-COV-2) (CORONAVIRUS DISEASE [COVID-19]), AMPLIFIED PROBE TECHNIQUE, MAKING USE OF HIGH THROUGHPUT TECHNOLOGIES AS DESCRIBED BY CMS-2020-01-R: HCPCS | Performed by: FAMILY MEDICINE

## 2020-08-24 NOTE — PROGRESS NOTES
COVID-19 Virtual Visit     Assessment/Plan:    Problem List Items Addressed This Visit        Other    Encounter for screening for other viral diseases - Primary     Salt water gargles and tylenol for the ulceration I will test patient for COVID as she also had diarrhea symptoms Patient is to self isolate until the results return  Patent to call if any new symptoms or any problems         Relevant Orders    Novel Coronavirus (COVID-19), PCR [facility] - Collected at [place] [id number] or [facility]        This virtual check-in was done via [employer] and patient was informed that this is not a secure, HIPAA-complaint platform  She agrees to proceed     Disposition:      I referred Radha to one of our centralized sites for a COVID-19 swab    I spent 12 minutes directly with the patient during this visit    Encounter provider Royce Schmitt DO    Provider located at [address] [place] [address] Road [id number]    Recent Visits  No visits were found meeting these conditions  Showing recent visits within past 7 days and meeting all other requirements     Today's Visits  Date Type Provider Dept   08/24/20 Telemedicine DO Leander Machuca   Showing today's visits and meeting all other requirements     Future Appointments  No visits were found meeting these conditions  Showing future appointments within next 150 days and meeting all other requirements        Patient agrees to participate in a virtual check in via telephone or video visit instead of presenting to the office to address urgent/immediate medical needs  Patient is aware this is a billable service  After connecting through [employer], the patient was identified by name and date of birth  Shelley Sheldon was informed that this was a telemedicine visit and that the exam was being conducted confidentially over secure lines  My office door was closed  No one else was in the room    Shelley Sheldon acknowledged consent and understanding of privacy and security of the telemedicine visit  I informed the patient that I have reviewed her record in Epic and presented the opportunity for her to ask any questions regarding the visit today  The patient agreed to participate  Alexandra Sargent is a 37 y o  female who is concerned about COVID-19  Patient noticed this morning an ulceration on her uvula  She reports sore throat and diarrhea  She has not experienced fever, chills, repeated shaking with chills, cough, shortness of breath, headache, anorexia, fatigue, myalgias, anosmia, abdominal pain, nausea and vomiting She has not had contact with a person who is under investigation for or who is positive for COVID-19 within the last 14 days  She has not been hospitalized recently for fever and/or lower respiratory symptoms  Past Medical History:   Diagnosis Date    Urinary tract infection     Varicella        Past Surgical History:   Procedure Laterality Date    COLONOSCOPY      ESOPHAGOGASTRODUODENOSCOPY  02/17/2012    Diagnostic; Dr Apolinar Cabrera, chronic gastritis    SKIN BIOPSY      TOOTH EXTRACTION         Current Outpatient Medications   Medication Sig Dispense Refill    citalopram (CeleXA) 20 mg tablet Take 1 tablet (20 mg total) by mouth daily 90 tablet 4    clonazePAM (KlonoPIN) 1 mg tablet Take 1 tablet (1 mg total) by mouth daily at bedtime 90 tablet 0    rizatriptan (MAXALT-MLT) 10 MG disintegrating tablet Take 1 tablet by mouth as needed       No current facility-administered medications for this visit  Allergies   Allergen Reactions    Doxycycline GI Intolerance       Review of Systems   Constitutional: Negative for activity change, appetite change, chills, fatigue, fever and unexpected weight change  HENT: Positive for sore throat  Respiratory: Negative for cough and shortness of breath  Cardiovascular: Negative for chest pain and palpitations     Gastrointestinal: Positive for diarrhea  Negative for abdominal pain, nausea and vomiting  Musculoskeletal: Negative for arthralgias and myalgias  Skin: Negative for rash  Neurological: Negative for headaches  Psychiatric/Behavioral: Negative for dysphoric mood, sleep disturbance and suicidal ideas  The patient is not nervous/anxious  Stable anxiety       Video Exam    There were no vitals filed for this visit  Radha appears healthy, alert, no distress  Physical Exam  Constitutional:       Appearance: Normal appearance  She is normal weight  HENT:      Head: Normocephalic and atraumatic  Right Ear: External ear normal       Left Ear: External ear normal       Mouth/Throat:      Comments: Ulceration center of uvula  Eyes:      Extraocular Movements: Extraocular movements intact  Conjunctiva/sclera: Conjunctivae normal       Pupils: Pupils are equal, round, and reactive to light  Pulmonary:      Effort: Pulmonary effort is normal    Neurological:      General: No focal deficit present  Mental Status: She is alert  She is disoriented  Cranial Nerves: No cranial nerve deficit  Psychiatric:         Mood and Affect: Mood normal          Behavior: Behavior normal          Thought Content: Thought content normal          Judgment: Judgment normal           VIRTUAL VISIT DISCLAIMER    Radha Wang acknowledges that she has consented to an online visit or consultation  She understands that the online visit is based solely on information provided by her, and that, in the absence of a face-to-face physical evaluation by the physician, the diagnosis she receives is both limited and provisional in terms of accuracy and completeness  This is not intended to replace a full medical face-to-face evaluation by the physician  Radha Wang understands and accepts these terms

## 2020-08-24 NOTE — ASSESSMENT & PLAN NOTE
Salt water gargles and tylenol for the ulceration I will test patient for COVID as she also had diarrhea symptoms Patient is to self isolate until the results return  Patent to call if any new symptoms or any problems

## 2020-08-25 LAB — SARS-COV-2 RNA SPEC QL NAA+PROBE: NOT DETECTED

## 2020-09-28 ENCOUNTER — TELEMEDICINE (OUTPATIENT)
Dept: PSYCHIATRY | Facility: CLINIC | Age: 35
End: 2020-09-28
Payer: COMMERCIAL

## 2020-09-28 DIAGNOSIS — F41.1 GAD (GENERALIZED ANXIETY DISORDER): ICD-10-CM

## 2020-09-28 DIAGNOSIS — F33.2 MDD (MAJOR DEPRESSIVE DISORDER), RECURRENT SEVERE, WITHOUT PSYCHOSIS (HCC): ICD-10-CM

## 2020-09-28 PROCEDURE — 1036F TOBACCO NON-USER: CPT | Performed by: PSYCHIATRY & NEUROLOGY

## 2020-09-28 PROCEDURE — 99213 OFFICE O/P EST LOW 20 MIN: CPT | Performed by: PSYCHIATRY & NEUROLOGY

## 2020-09-28 RX ORDER — CITALOPRAM 20 MG/1
20 TABLET ORAL DAILY
Qty: 90 TABLET | Refills: 4 | Status: SHIPPED | OUTPATIENT
Start: 2020-09-28 | End: 2021-01-05 | Stop reason: SDUPTHER

## 2020-09-28 RX ORDER — CLONAZEPAM 1 MG/1
1 TABLET ORAL
Qty: 90 TABLET | Refills: 0 | Status: SHIPPED | OUTPATIENT
Start: 2020-09-28 | End: 2021-01-05

## 2020-09-28 NOTE — BH TREATMENT PLAN
TREATMENT PLAN (Medication Management Only)        [facility]    Name and Date of Birth:  Chidi Ramos 37 y o  1985  Date of Treatment Plan: September 28, 2020  Diagnosis/Diagnoses:    1  SHARA (generalized anxiety disorder)    2  MDD (major depressive disorder), recurrent severe, without psychosis ([facility] [address] )      Strengths/Personal Resources for Self-Care: taking medications as prescribed, ability to communicate needs  Area/Areas of need (in own words): anxiety, anxiety symptoms, depression, depressive symptoms  1  Long Term Goal: continue improvement in anxiety  Target Date:6 months - 3/28/2021  Person/Persons responsible for completion of goal: Cherish  2  Short Term Objective (s) - How will we reach this goal?:   A  Provider new recommended medication/dosage changes and/or continue medication(s): continue current medications as prescribed  B  N/A   C  N/A  Target Date:6 months - 3/28/2021  Person/Persons Responsible for Completion of Goal: Cherish  Progress Towards Goals: continuing treatment  Treatment Modality: medication management every 6 months  Review due 180 days from date of this plan: 6 months  Expected length of service: ongoing treatment  My Physician/PA/NP and I have developed this plan together and I agree to work on the goals and objectives  I understand the treatment goals that were developed for my treatment

## 2020-09-28 NOTE — PSYCH
Virtual Regular Visit      Assessment/Plan:    Problem List Items Addressed This Visit        Other    SHARA (generalized anxiety disorder)    Relevant Medications    clonazePAM (KlonoPIN) 1 mg tablet    citalopram (CeleXA) 20 mg tablet    MDD (major depressive disorder), recurrent severe, without psychosis ([facility] [address] )    Relevant Medications    citalopram (CeleXA) 20 mg tablet               Reason for visit is   Chief Complaint   Patient presents with    Virtual Regular Visit        Encounter provider Herminia Malcolm MD    Provider located at [address] [place] [address] [address]  [facility] [id number]      Recent Visits  No visits were found meeting these conditions  Showing recent visits within past 7 days and meeting all other requirements     Today's Visits  Date Type Provider Dept   09/28/20 Telemedicine Herminia Malcolm MD [facility] today's visits and meeting all other requirements     Future Appointments  No visits were found meeting these conditions  Showing future appointments within next 150 days and meeting all other requirements        The patient was identified by name and date of birth  Frank Leyva was informed that this is a telemedicine visit and that the visit is being conducted through [employer]  My office door was closed  No one else was in the room  She acknowledged consent and understanding of privacy and security of the video platform  The patient has agreed to participate and understands they can discontinue the visit at any time  Patient is aware this is a billable service  Suman Plata is a 37 y o  female with MDD and SHARA  Patient stated that since last seen she remains compliant with medications  She denies medication side effects  No recent health changes or new medications   She stated she had plan to gradually taper off Celexa but when the COVID 19 pandemia was declared she had to go back to 20 mg Citalopram because she started having panic attacks again  She also started seeing her counselor on a weekly basis which has helped  She currently feels her mood is table and will continue current treatment  She agrees to schedule follow up in 6 months or sooner if needed  HPI     Past Medical History:   Diagnosis Date    Urinary tract infection     Varicella        Past Surgical History:   Procedure Laterality Date    COLONOSCOPY      ESOPHAGOGASTRODUODENOSCOPY  02/17/2012    Diagnostic; Dr Zach Chaparro, chronic gastritis    SKIN BIOPSY      TOOTH EXTRACTION         Current Outpatient Medications   Medication Sig Dispense Refill    citalopram (CeleXA) 20 mg tablet Take 1 tablet (20 mg total) by mouth daily 90 tablet 4    clonazePAM (KlonoPIN) 1 mg tablet Take 1 tablet (1 mg total) by mouth daily at bedtime 90 tablet 0    rizatriptan (MAXALT-MLT) 10 MG disintegrating tablet Take 1 tablet by mouth as needed       No current facility-administered medications for this visit  Allergies   Allergen Reactions    Doxycycline GI Intolerance       Review of Systems     Mood Anxiety and Depression   Behavior Normal    Thought Content Disturbing Thoughts, Feelings   General Emotional Problems, Sleep Disturbances and Decreased Functioning   Personality Normal   Other Psych Symptoms Normal   Constitutional Negative   ENT Negative   Cardiovascular Negative   Respiratory Negative   Gastrointestinal Negative   Genitourinary Negative   Musculoskeletal Negative   Integumentary Negative   Neurological Negative   Endocrine Normal    Other Symptoms Normal              Laboratory Results: No results found for this or any previous visit      Substance Abuse History:  Social History     Substance and Sexual Activity   Drug Use No       Family Psychiatric History:   Family History   Problem Relation Age of Onset    Hypothyroidism Mother     Other Maternal Grandmother         Thyroid disorder    Colon cancer Paternal Grandmother     Other Paternal Grandmother         Thyroid disorder    Colon cancer Paternal Grandfather     Other Paternal Grandfather         Thyroid disorder    Other Maternal Aunt         Thyroid disorder    Other Paternal Aunt         Thyroid disorder    Other Maternal Uncle         Thyroid disorder    Colon cancer Paternal Uncle     Crohn's disease Paternal Uncle     Other Paternal Uncle         Thyroid disorder    Other Family         Benign polyps of the large intestine    No Known Problems Father        The following portions of the patient's history were reviewed and updated as appropriate: allergies, current medications, past family history, past medical history, past social history, past surgical history and problem list     Social History     Socioeconomic History    Marital status: /Civil Union     Spouse name: Not on file    Number of children: Not on file    Years of education: Not on file    Highest education level: Not on file   Occupational History    Not on file   Social Needs    Financial resource strain: Not on file    Food insecurity     Worry: Not on file     Inability: Not on file    Transportation needs     Medical: Not on file     Non-medical: Not on file   Tobacco Use    Smoking status: Former Smoker     Last attempt to quit: 2011     Years since quittin 3    Smokeless tobacco: Never Used   Substance and Sexual Activity    Alcohol use: Yes     Comment: Social drinker    Drug use: No    Sexual activity: Yes     Partners: Female   Lifestyle    Physical activity     Days per week: Not on file     Minutes per session: Not on file    Stress: Not on file   Relationships    Social connections     Talks on phone: Not on file     Gets together: Not on file     Attends [employer] service: Not on file     Active member of club or organization: Not on file     Attends meetings of clubs or organizations: Not on file Relationship status: Not on file    Intimate partner violence     Fear of current or ex partner: Not on file     Emotionally abused: Not on file     Physically abused: Not on file     Forced sexual activity: Not on file   Other Topics Concern    Not on file   Social History Narrative    Caffeine use    Daily coffee consumption (1 cup/day)    Denied:  History of     Congregational affiliation:  None    Uses safety equipment - seatbelts     Social History     Social History Narrative    Caffeine use    Daily coffee consumption (1 cup/day)    Denied:  History of     Congregational affiliation:  None    Uses safety equipment - seatbelts       Objective:       Mental status:  Appearance calm and cooperative , adequate hygiene and grooming and good eye contact    Mood dysphoric   Affect affect was constricted   Speech a normal rate and fluent   Thought Processes coherent/organized and normal thought processes   Hallucinations no hallucinations present    Thought Content no delusions   Abnormal Thoughts no suicidal thoughts  and no homicidal thoughts    Orientation  oriented to person and place and time   Remote Memory short term memory intact and long term memory intact   Attention Span concentration intact   Intellect Appears to be of Average Intelligence   Insight Limited insight   Judgement judgment was limited   Muscle Strength n/a   Language no difficulty naming common objects, no difficulty repeating a phrase  and no difficulty writing a sentence    Fund of Knowledge displays adequate knowledge of current events, adequate fund of knowledge regarding past history and adequate fund of knowledge regarding vocabulary    Pain none   Pain Scale 0       Assessment/Plan:       Diagnoses and all orders for this visit:    SHARA (generalized anxiety disorder)  -     clonazePAM (KlonoPIN) 1 mg tablet;  Take 1 tablet (1 mg total) by mouth daily at bedtime    MDD (major depressive disorder), recurrent severe, without psychosis ([facility] )  -     citalopram (CeleXA) 20 mg tablet; Take 1 tablet (20 mg total) by mouth daily            Treatment Recommendations- Risks Benefits      Immediate Medical/Psychiatric/Psychotherapy Treatments and Any Precautions: continue current treatment     Risks, Benefits And Possible Side Effects Of Medications:  {PSYCH RISK, BENEFITS AND POSSIBLE SIDE EFFECTS (Optional):14984    Controlled Medication Discussion: Discussed with patient Black Box warning on concurrent use of benzodiazepines and opioid medications including sedation, respiratory depression, coma and death  Patient understands the risk of treatment with benzodiazepines in addition to opioids and wants to continue taking those medications  , Discussed with patient the risks of sedation, respiratory depression, impairment of ability to drive and potential for abuse and addiction related to treatment with benzodiazepine medications  The patient understands risk of treatment with benzodiazepine medications, agrees to not drive if feels impaired and agrees to take medications as prescribed  and The patient has been filling controlled prescriptions on time as prescribed to [facility]       Psychotherapy Provided:     Individual psychotherapy provided: No      I spent 20 minutes directly with the patient during this visit      VIRTUAL VISIT Yascatherineestrella acknowledges that she has consented to an online visit or consultation  She understands that the online visit is based solely on information provided by her, and that, in the absence of a face-to-face physical evaluation by the physician, the diagnosis she receives is both limited and provisional in terms of accuracy and completeness  This is not intended to replace a full medical face-to-face evaluation by the physician  Radha Wang understands and accepts these terms

## 2020-12-04 ENCOUNTER — TRANSCRIBE ORDERS (OUTPATIENT)
Dept: ADMINISTRATIVE | Facility: HOSPITAL | Age: 35
End: 2020-12-04

## 2020-12-04 DIAGNOSIS — Z20.828 EXPOSURE TO SARS-ASSOCIATED CORONAVIRUS: ICD-10-CM

## 2020-12-04 DIAGNOSIS — Z20.828 EXPOSURE TO SARS-ASSOCIATED CORONAVIRUS: Primary | ICD-10-CM

## 2020-12-04 PROCEDURE — U0003 INFECTIOUS AGENT DETECTION BY NUCLEIC ACID (DNA OR RNA); SEVERE ACUTE RESPIRATORY SYNDROME CORONAVIRUS 2 (SARS-COV-2) (CORONAVIRUS DISEASE [COVID-19]), AMPLIFIED PROBE TECHNIQUE, MAKING USE OF HIGH THROUGHPUT TECHNOLOGIES AS DESCRIBED BY CMS-2020-01-R: HCPCS | Performed by: FAMILY MEDICINE

## 2020-12-05 LAB — SARS-COV-2 RNA SPEC QL NAA+PROBE: NOT DETECTED

## 2021-01-05 DIAGNOSIS — F33.2 MDD (MAJOR DEPRESSIVE DISORDER), RECURRENT SEVERE, WITHOUT PSYCHOSIS (HCC): ICD-10-CM

## 2021-01-05 DIAGNOSIS — F41.1 GAD (GENERALIZED ANXIETY DISORDER): ICD-10-CM

## 2021-01-05 RX ORDER — CITALOPRAM 20 MG/1
20 TABLET ORAL DAILY
Qty: 90 TABLET | Refills: 4 | Status: SHIPPED | OUTPATIENT
Start: 2021-01-05 | End: 2021-04-29 | Stop reason: SDUPTHER

## 2021-01-05 RX ORDER — CLONAZEPAM 1 MG/1
TABLET ORAL
Qty: 90 TABLET | Refills: 0 | Status: SHIPPED | OUTPATIENT
Start: 2021-01-05 | End: 2021-04-29 | Stop reason: SDUPTHER

## 2021-01-12 ENCOUNTER — TELEPHONE (OUTPATIENT)
Dept: FAMILY MEDICINE CLINIC | Facility: CLINIC | Age: 36
End: 2021-01-12

## 2021-01-12 DIAGNOSIS — G43.009 MIGRAINE WITHOUT AURA AND WITHOUT STATUS MIGRAINOSUS, NOT INTRACTABLE: Primary | ICD-10-CM

## 2021-01-12 RX ORDER — RIZATRIPTAN BENZOATE 10 MG/1
10 TABLET, ORALLY DISINTEGRATING ORAL ONCE AS NEEDED
Qty: 9 TABLET | Refills: 0 | Status: SHIPPED | OUTPATIENT
Start: 2021-01-12

## 2021-01-12 NOTE — TELEPHONE ENCOUNTER
She is asking if you could prescribe the Maxalt to [facility] based on her 4/20 virtual visit with you and she will make an in person check up in the next few weeks

## 2021-01-15 ENCOUNTER — LAB (OUTPATIENT)
Dept: LAB | Age: 36
End: 2021-01-15
Payer: COMMERCIAL

## 2021-01-15 DIAGNOSIS — G43.119 INTRACTABLE MIGRAINE WITH AURA WITHOUT STATUS MIGRAINOSUS: ICD-10-CM

## 2021-01-15 DIAGNOSIS — Z13.220 SCREENING, LIPID: ICD-10-CM

## 2021-01-15 DIAGNOSIS — E55.9 VITAMIN D DEFICIENCY: ICD-10-CM

## 2021-01-15 DIAGNOSIS — R53.83 FATIGUE, UNSPECIFIED TYPE: ICD-10-CM

## 2021-01-15 LAB
25(OH)D3 SERPL-MCNC: 20.9 NG/ML (ref 30–100)
ALBUMIN SERPL BCP-MCNC: 4.1 G/DL (ref 3.5–5)
ALP SERPL-CCNC: 45 U/L (ref 46–116)
ALT SERPL W P-5'-P-CCNC: 30 U/L (ref 12–78)
ANION GAP SERPL CALCULATED.3IONS-SCNC: 8 MMOL/L (ref 4–13)
AST SERPL W P-5'-P-CCNC: 25 U/L (ref 5–45)
BILIRUB SERPL-MCNC: 1.03 MG/DL (ref 0.2–1)
BUN SERPL-MCNC: 7 MG/DL (ref 5–25)
CALCIUM SERPL-MCNC: 9.6 MG/DL (ref 8.3–10.1)
CHLORIDE SERPL-SCNC: 104 MMOL/L (ref 100–108)
CHOLEST SERPL-MCNC: 201 MG/DL (ref 50–200)
CO2 SERPL-SCNC: 24 MMOL/L (ref 21–32)
CREAT SERPL-MCNC: 0.64 MG/DL (ref 0.6–1.3)
ERYTHROCYTE [DISTWIDTH] IN BLOOD BY AUTOMATED COUNT: 12.3 % (ref 11.6–15.1)
GFR SERPL CREATININE-BSD FRML MDRD: 118 ML/MIN/1.73SQ M
GLUCOSE P FAST SERPL-MCNC: 67 MG/DL (ref 65–99)
HCT VFR BLD AUTO: 38.6 % (ref 34.8–46.1)
HDLC SERPL-MCNC: 119 MG/DL
HGB BLD-MCNC: 11.8 G/DL (ref 11.5–15.4)
LDLC SERPL CALC-MCNC: 56 MG/DL (ref 0–100)
MCH RBC QN AUTO: 29.9 PG (ref 26.8–34.3)
MCHC RBC AUTO-ENTMCNC: 30.6 G/DL (ref 31.4–37.4)
MCV RBC AUTO: 98 FL (ref 82–98)
NONHDLC SERPL-MCNC: 82 MG/DL
PLATELET # BLD AUTO: 222 THOUSANDS/UL (ref 149–390)
PMV BLD AUTO: 10 FL (ref 8.9–12.7)
POTASSIUM SERPL-SCNC: 4 MMOL/L (ref 3.5–5.3)
PROT SERPL-MCNC: 7.4 G/DL (ref 6.4–8.2)
RBC # BLD AUTO: 3.95 MILLION/UL (ref 3.81–5.12)
SODIUM SERPL-SCNC: 136 MMOL/L (ref 136–145)
TRIGL SERPL-MCNC: 131 MG/DL
TSH SERPL DL<=0.05 MIU/L-ACNC: 1.55 UIU/ML (ref 0.36–3.74)
WBC # BLD AUTO: 5.47 THOUSAND/UL (ref 4.31–10.16)

## 2021-01-15 PROCEDURE — 85027 COMPLETE CBC AUTOMATED: CPT

## 2021-01-15 PROCEDURE — 84443 ASSAY THYROID STIM HORMONE: CPT

## 2021-01-15 PROCEDURE — 82306 VITAMIN D 25 HYDROXY: CPT

## 2021-01-15 PROCEDURE — 36415 COLL VENOUS BLD VENIPUNCTURE: CPT

## 2021-01-15 PROCEDURE — 80053 COMPREHEN METABOLIC PANEL: CPT

## 2021-01-15 PROCEDURE — 80061 LIPID PANEL: CPT

## 2021-01-28 ENCOUNTER — TELEPHONE (OUTPATIENT)
Dept: PSYCHIATRY | Facility: CLINIC | Age: 36
End: 2021-01-28

## 2021-01-28 ENCOUNTER — TELEMEDICINE (OUTPATIENT)
Dept: PSYCHIATRY | Facility: CLINIC | Age: 36
End: 2021-01-28
Payer: COMMERCIAL

## 2021-01-28 DIAGNOSIS — F51.04 PSYCHOPHYSIOLOGICAL INSOMNIA: ICD-10-CM

## 2021-01-28 DIAGNOSIS — F41.1 GAD (GENERALIZED ANXIETY DISORDER): Primary | ICD-10-CM

## 2021-01-28 DIAGNOSIS — F33.2 MDD (MAJOR DEPRESSIVE DISORDER), RECURRENT SEVERE, WITHOUT PSYCHOSIS (HCC): ICD-10-CM

## 2021-01-28 PROCEDURE — 99214 OFFICE O/P EST MOD 30 MIN: CPT | Performed by: PSYCHIATRY & NEUROLOGY

## 2021-01-28 PROCEDURE — 90833 PSYTX W PT W E/M 30 MIN: CPT | Performed by: PSYCHIATRY & NEUROLOGY

## 2021-01-28 RX ORDER — RAMELTEON 8 MG/1
8 TABLET ORAL
Qty: 30 TABLET | Refills: 2 | Status: SHIPPED | OUTPATIENT
Start: 2021-01-28 | End: 2021-04-29

## 2021-01-28 NOTE — BH TREATMENT PLAN
TREATMENT PLAN (Medication Management Only)        [facility]    Name and Date of Birth:  Sabina Bill 37 y o  1985  Date of Treatment Plan: January 28, 2021  Diagnosis/Diagnoses:    1  SHARA (generalized anxiety disorder)    2  MDD (major depressive disorder), recurrent severe, without psychosis ([facility] [address] )    3  Psychophysiological insomnia      Strengths/Personal Resources for Self-Care: taking medications as prescribed, ability to communicate needs  Area/Areas of need (in own words): anxiety, anxiety symptoms, depression, depressive symptoms  1  Long Term Goal: continue improvement in depression  Target Date:3 months - 4/28/2021  Person/Persons responsible for completion of goal: Cherish  2  Short Term Objective (s) - How will we reach this goal?:   A  Provider new recommended medication/dosage changes and/or continue medication(s): continue current medications as prescribed  B  N/A   C  N/A  Target Date:3 months - 4/28/2021  Person/Persons Responsible for Completion of Goal: Cherish  Progress Towards Goals: continuing treatment  Treatment Modality: medication management every 3 months  Review due 180 days from date of this plan: 6 months - 7/28/2021  Expected length of service: ongoing treatment  My Physician/PA/NP and I have developed this plan together and I agree to work on the goals and objectives  I understand the treatment goals that were developed for my treatment

## 2021-01-28 NOTE — TELEPHONE ENCOUNTER
Left message for Radha Wang and/or Parent/Guardian to call office back at [phone] to schedule appointment with Renuka Lopez MD     Reason:   3 mo f/u    Last completed appointment with provider:   1/28/21

## 2021-01-28 NOTE — PSYCH
Virtual Regular Visit      Assessment/Plan:    Problem List Items Addressed This Visit        Other    SHARA (generalized anxiety disorder) - Primary    Relevant Medications    ramelteon (ROZEREM) 8 mg tablet    MDD (major depressive disorder), recurrent severe, without psychosis (HCC)    Relevant Medications    ramelteon (ROZEREM) 8 mg tablet      Other Visit Diagnoses     Psychophysiological insomnia        Relevant Medications    ramelteon (ROZEREM) 8 mg tablet               Reason for visit is   Chief Complaint   Patient presents with    Virtual Regular Visit        Encounter provider Bryant Cadena MD    Provider located at [address] [place], [place] [id number]      Recent Visits  No visits were found meeting these conditions  Showing recent visits within past 7 days and meeting all other requirements     Today's Visits  Date Type Provider Dept   01/28/21 Telephone Bryant Cadena MD [address] [place] [address]   01/28/21  [facility] today's visits and meeting all other requirements     Future Appointments  No visits were found meeting these conditions  Showing future appointments within next 150 days and meeting all other requirements        The patient was identified by name and date of birth  Matt Akbar was informed that this is a telemedicine visit and that the visit is being conducted through [employer] and patient was informed that this is a secure, HIPAA-compliant platform  She agrees to proceed     My office door was closed  No one else was in the room  She acknowledged consent and understanding of privacy and security of the video platform  The patient has agreed to participate and understands they can discontinue the visit at any time  Patient is aware this is a billable service  Santos Hewitt is a 37 y o  female with MDD and SHARA  Radha remains compliant with medications and denies side effects  She denies recent health changes or new medications  She stated lately she has been having difficulties falling and staying asleep even after taking Clonazepam at bedtime which used to always helped her  In the past she tried OTC sleep aids like somninex but caused her daytime sedation and it was difficult to function the next day, she also tied melatonin without any benefits and Trazodone as well  She also tried Ambien in the past but experienced sleep walking  Agrees to try Rozerem 8 mg po qhs  She stated she has not been depressed but has high anxiety due to work and the ongoing COVID 19 pandemia  She also received COVID vaccine and had a reaction to her first dose and was sick for several days  She agrees to try new sleep aid an continue other medications as prescribed  Will schedule follow up in 3 months or sooner if needed  HPI     Past Medical History:   Diagnosis Date    Urinary tract infection     Varicella        Past Surgical History:   Procedure Laterality Date    COLONOSCOPY      ESOPHAGOGASTRODUODENOSCOPY  02/17/2012    Diagnostic; Dr Ana Luisa Alexandre, chronic gastritis    SKIN BIOPSY      TOOTH EXTRACTION         Current Outpatient Medications   Medication Sig Dispense Refill    citalopram (CeleXA) 20 mg tablet Take 1 tablet (20 mg total) by mouth daily 90 tablet 4    clonazePAM (KlonoPIN) 1 mg tablet TAKE 1 TABLET DAILY AT BEDTIME 90 tablet 0    ramelteon (ROZEREM) 8 mg tablet Take 1 tablet (8 mg total) by mouth daily at bedtime 30 tablet 2    rizatriptan (MAXALT-MLT) 10 MG disintegrating tablet Take 1 tablet (10 mg total) by mouth once as needed for migraine for up to 1 dose 9 tablet 0     No current facility-administered medications for this visit           Allergies   Allergen Reactions    Doxycycline GI Intolerance       Review of Systems      Mood Anxiety and Depression   Behavior Normal    Thought Content Disturbing Thoughts, Feelings   General Emotional Problems, Sleep Disturbances and Decreased Functioning   Personality Normal   Other Psych Symptoms Normal   Constitutional Negative   ENT Negative   Cardiovascular Negative   Respiratory Negative   Gastrointestinal Negative   Genitourinary Negative   Musculoskeletal Negative   Integumentary Negative   Neurological Negative   Endocrine Normal    Other Symptoms Normal              Laboratory Results: No results found for this or any previous visit      Substance Abuse History:  Social History     Substance and Sexual Activity   Drug Use No       Family Psychiatric History:   Family History   Problem Relation Age of Onset    Hypothyroidism Mother     Other Maternal Grandmother         Thyroid disorder    Colon cancer Paternal Grandmother     Other Paternal Grandmother         Thyroid disorder    Colon cancer Paternal Grandfather     Other Paternal Grandfather         Thyroid disorder    Other Maternal Aunt         Thyroid disorder    Other Paternal Aunt         Thyroid disorder    Other Maternal Uncle         Thyroid disorder    Colon cancer Paternal Uncle     Crohn's disease Paternal Uncle     Other Paternal Uncle         Thyroid disorder    Other Family         Benign polyps of the large intestine    No Known Problems Father        The following portions of the patient's history were reviewed and updated as appropriate: allergies, current medications, past family history, past medical history, past social history, past surgical history and problem list     Social History     Socioeconomic History    Marital status: /Civil Union     Spouse name: Not on file    Number of children: Not on file    Years of education: Not on file    Highest education level: Not on file   Occupational History    Not on file   Social Needs    Financial resource strain: Not on file    Food insecurity     Worry: Not on file     Inability: Not on file   Efren Velez Transportation needs     Medical: Not on file     Non-medical: Not on file   Tobacco Use    Smoking status: Former Smoker     Quit date: 2011     Years since quittin 6    Smokeless tobacco: Never Used   Substance and Sexual Activity    Alcohol use: Yes     Comment: Social drinker    Drug use: No    Sexual activity: Yes     Partners: Female   Lifestyle    Physical activity     Days per week: Not on file     Minutes per session: Not on file    Stress: Not on file   Relationships    Social connections     Talks on phone: Not on file     Gets together: Not on file     Attends Congregational service: Not on file     Active member of club or organization: Not on file     Attends meetings of clubs or organizations: Not on file     Relationship status: Not on file    Intimate partner violence     Fear of current or ex partner: Not on file     Emotionally abused: Not on file     Physically abused: Not on file     Forced sexual activity: Not on file   Other Topics Concern    Not on file   Social History Narrative    Caffeine use    Daily coffee consumption (1 cup/day)    Denied:  History of     Catholic affiliation:  None    Uses safety equipment - seatbelts     Social History     Social History Narrative    Caffeine use    Daily coffee consumption (1 cup/day)    Denied:  History of     Catholic affiliation:  None    Uses safety equipment - seatbelts       Objective:       Mental status:  Appearance calm and cooperative , adequate hygiene and grooming and good eye contact    Mood dysphoric   Affect affect was constricted   Speech a normal rate and fluent   Thought Processes coherent/organized and normal thought processes   Hallucinations no hallucinations present    Thought Content no delusions   Abnormal Thoughts no suicidal thoughts  and no homicidal thoughts    Orientation  oriented to person and place and time   Remote Memory short term memory intact and long term memory intact   Attention Span concentration intact   Intellect Appears to be of Average Intelligence   Insight Limited insight   Judgement judgment was limited   Muscle Strength n/a   Language no difficulty naming common objects and no difficulty repeating a phrase    Fund of Knowledge displays adequate knowledge of current events               Assessment/Plan:       Diagnoses and all orders for this visit:    SHARA (generalized anxiety disorder)    MDD (major depressive disorder), recurrent severe, without psychosis ([facility])    Psychophysiological insomnia  -     ramelteon (ROZEREM) 8 mg tablet; Take 1 tablet (8 mg total) by mouth daily at bedtime            Treatment Recommendations- Risks Benefits      Immediate Medical/Psychiatric/Psychotherapy Treatments and Any Precautions: continue current medications, start Rozerem 8 mg po qhs for insomnia    Risks, Benefits And Possible Side Effects Of Medications:  {PSYCH RISK, BENEFITS AND POSSIBLE SIDE EFFECTS (Optional):73019    Controlled Medication Discussion: Discussed with patient Black Box warning on concurrent use of benzodiazepines and opioid medications including sedation, respiratory depression, coma and death  Patient understands the risk of treatment with benzodiazepines in addition to opioids and wants to continue taking those medications  , Discussed with patient the risks of sedation, respiratory depression, impairment of ability to drive and potential for abuse and addiction related to treatment with benzodiazepine medications  The patient understands risk of treatment with benzodiazepine medications, agrees to not drive if feels impaired and agrees to take medications as prescribed  and The patient has been filling controlled prescriptions on time as [facility]       Psychotherapy Provided:     Individual psychotherapy provided: Yes  Counseling was provided during the session today for 16 minutes    Medications, treatment progress and treatment plan reviewed with Radha  Medication changes discussed with Radha  Medication education provided to Radha  Coping strategies including compliance with medications, deep/slow breathing, maintain healthy diet, maintain heathy sleeping hygiene and maintain positive attitude reviewed with Radha  Importance of medication and treatment compliance reviewed with Radha  Educated on importance of medication and treatment compliance  Supportive therapy provided  I spent 30 minutes directly with the patient during this visit      [address] acknowledges that she has consented to an online visit or consultation  She understands that the online visit is based solely on information provided by her, and that, in the absence of a face-to-face physical evaluation by the physician, the diagnosis she receives is both limited and provisional in terms of accuracy and completeness  This is not intended to replace a full medical face-to-face evaluation by the physician  Radha Wang understands and accepts these terms

## 2021-04-29 ENCOUNTER — TELEMEDICINE (OUTPATIENT)
Dept: PSYCHIATRY | Facility: CLINIC | Age: 36
End: 2021-04-29
Payer: COMMERCIAL

## 2021-04-29 DIAGNOSIS — F33.2 MDD (MAJOR DEPRESSIVE DISORDER), RECURRENT SEVERE, WITHOUT PSYCHOSIS (HCC): ICD-10-CM

## 2021-04-29 DIAGNOSIS — F41.1 GAD (GENERALIZED ANXIETY DISORDER): Primary | ICD-10-CM

## 2021-04-29 PROCEDURE — 99214 OFFICE O/P EST MOD 30 MIN: CPT | Performed by: PSYCHIATRY & NEUROLOGY

## 2021-04-29 PROCEDURE — 90833 PSYTX W PT W E/M 30 MIN: CPT | Performed by: PSYCHIATRY & NEUROLOGY

## 2021-04-29 RX ORDER — METHOCARBAMOL 500 MG/1
500 TABLET, FILM COATED ORAL 4 TIMES DAILY PRN
COMMUNITY
Start: 2021-04-26 | End: 2021-05-06

## 2021-04-29 RX ORDER — METHYLPREDNISOLONE 4 MG/1
TABLET ORAL
COMMUNITY
Start: 2021-04-26 | End: 2021-05-02

## 2021-04-29 RX ORDER — ERGOCALCIFEROL 1.25 MG/1
CAPSULE ORAL
COMMUNITY
Start: 2021-03-04 | End: 2021-08-20

## 2021-04-29 RX ORDER — CITALOPRAM 10 MG/1
10 TABLET ORAL DAILY
Qty: 90 TABLET | Refills: 0 | Status: SHIPPED | OUTPATIENT
Start: 2021-04-29 | End: 2021-07-10

## 2021-04-29 RX ORDER — CLONAZEPAM 1 MG/1
1 TABLET ORAL
Qty: 90 TABLET | Refills: 0 | Status: SHIPPED | OUTPATIENT
Start: 2021-04-29 | End: 2021-08-20 | Stop reason: SDUPTHER

## 2021-04-29 NOTE — PSYCH
Virtual Regular Visit      Assessment/Plan:    Problem List Items Addressed This Visit        Other    SHARA (generalized anxiety disorder) - Primary    Relevant Medications    citalopram (CeleXA) 10 mg tablet    clonazePAM (KlonoPIN) 1 mg tablet    MDD (major depressive disorder), recurrent severe, without psychosis ([facility] [address] )    Relevant Medications    citalopram (CeleXA) 10 mg tablet               Reason for visit is   Chief Complaint   Patient presents with    Virtual Regular Visit        Encounter provider Kaylan Amezcua MD    Provider located at [address] [address] [phone]      Recent Visits  Date Type Provider Dept   04/29/21 MD Aliza eHrrera 18 recent visits within past 7 days and meeting all other requirements     Future Appointments  No visits were found meeting these conditions  Showing future appointments within next 150 days and meeting all other requirements        The patient was identified by name and date of birth  Maykel Andre was informed that this is a telemedicine visit and that the visit is being conducted through [employer] and patient was informed that this is a secure, HIPAA-compliant platform  She agrees to proceed     My office door was closed  No one else was in the room  She acknowledged consent and understanding of privacy and security of the video platform  The patient has agreed to participate and understands they can discontinue the visit at any time  Patient is aware this is a billable service  Endy Bingham is a 37 y o  female with MDD and SHARA  Radha has been compliant with her medications  She denies recent health changes or new medications  She stated that since last seen her mood has been stable, she denies feeling depressed or anxious   She has been contemplating coming off of her medications, more so because she has noticed hand tremors and she researched it and found out that it could be a side effect of Citalopram  She will like to start lowering the dose of Citalopram with the paln to eventually completely come off of it  Agree to decrease Citalopram to 10 mg daily  She will continue Clonazepam 1 mg po qhs prn  Will schedule follow up in 3 months or sooner if needed  HPI     Past Medical History:   Diagnosis Date    Urinary tract infection     Varicella        Past Surgical History:   Procedure Laterality Date    COLONOSCOPY      ESOPHAGOGASTRODUODENOSCOPY  02/17/2012    Diagnostic; Dr Julia Jacobo, chronic gastritis    SKIN BIOPSY      TOOTH EXTRACTION         Current Outpatient Medications   Medication Sig Dispense Refill    methocarbamol (ROBAXIN) 500 mg tablet Take 500 mg by mouth 4 (four) times a day as needed      citalopram (CeleXA) 10 mg tablet Take 1 tablet (10 mg total) by mouth daily 90 tablet 0    clonazePAM (KlonoPIN) 1 mg tablet Take 1 tablet (1 mg total) by mouth daily at bedtime 90 tablet 0    ergocalciferol (VITAMIN D2) 50,000 units       rizatriptan (MAXALT-MLT) 10 MG disintegrating tablet Take 1 tablet (10 mg total) by mouth once as needed for migraine for up to 1 dose 9 tablet 0     No current facility-administered medications for this visit           Allergies   Allergen Reactions    Doxycycline GI Intolerance       Review of Systems     Mood Normal   Behavior Normal    Thought Content Disturbing Thoughts, Feelings   General Emotional Problems and Decreased Functioning   Personality Normal   Other Psych Symptoms Normal   Constitutional Negative   ENT Negative   Cardiovascular Negative   Respiratory Negative   Gastrointestinal Negative   Genitourinary Negative   Musculoskeletal Negative   Integumentary Negative   Neurological Negative   Endocrine Normal    Other Symptoms Normal              Laboratory Results: No results found for this or any previous visit      Substance Abuse History:  Social History     Substance and Sexual Activity   Drug Use No       Family Psychiatric History:   Family History   Problem Relation Age of Onset    Hypothyroidism Mother     Other Maternal Grandmother         Thyroid disorder    Colon cancer Paternal Grandmother     Other Paternal Grandmother         Thyroid disorder    Colon cancer Paternal Grandfather     Other Paternal Grandfather         Thyroid disorder    Other Maternal Aunt         Thyroid disorder    Other Paternal Aunt         Thyroid disorder    Other Maternal Uncle         Thyroid disorder    Colon cancer Paternal Uncle     Crohn's disease Paternal Uncle     Other Paternal Uncle         Thyroid disorder    Other Family         Benign polyps of the large intestine    No Known Problems Father        The following portions of the patient's history were reviewed and updated as appropriate: allergies, current medications, past family history, past medical history, past social history, past surgical history and problem list     Social History     Socioeconomic History    Marital status: /Civil Union     Spouse name: Not on file    Number of children: Not on file    Years of education: Not on file    Highest education level: Not on file   Occupational History    Not on file   Social Needs    Financial resource strain: Not on file    Food insecurity     Worry: Not on file     Inability: Not on file    Transportation needs     Medical: Not on file     Non-medical: Not on file   Tobacco Use    Smoking status: Former Smoker     Quit date: 2011     Years since quittin 9    Smokeless tobacco: Never Used   Substance and Sexual Activity    Alcohol use: Yes     Comment: Social drinker    Drug use: No    Sexual activity: Yes     Partners: Female   Lifestyle    Physical activity     Days per week: Not on file     Minutes per session: Not on file    Stress: Not on file   Relationships    Social connections     Talks on phone: Not on file     Gets together: Not on file     Attends Jewish service: Not on file     Active member of club or organization: Not on file     Attends meetings of clubs or organizations: Not on file     Relationship status: Not on file    Intimate partner violence     Fear of current or ex partner: Not on file     Emotionally abused: Not on file     Physically abused: Not on file     Forced sexual activity: Not on file   Other Topics Concern    Not on file   Social History Narrative    Caffeine use    Daily coffee consumption (1 cup/day)    Denied:  History of     Jain affiliation:  None    Uses safety equipment - seatbelts     Social History     Social History Narrative    Caffeine use    Daily coffee consumption (1 cup/day)    Denied:  History of     Jain affiliation:  None    Uses safety equipment - seatbelts       Objective:       Mental status:  Appearance calm and cooperative , adequate hygiene and grooming and good eye contact    Mood euthymic   Affect affect was constricted   Speech a normal rate and fluent   Thought Processes normal thought processes   Hallucinations no hallucinations present    Thought Content no delusions   Abnormal Thoughts no suicidal thoughts  and no homicidal thoughts    Orientation  oriented to person and place and time   Remote Memory short term memory intact and long term memory intact   Attention Span concentration intact   Intellect Appears to be of Average Intelligence   Insight Limited insight   Judgement judgment was limited   Muscle Strength n/a   Language no difficulty naming common objects and no difficulty repeating a phrase    Fund of Knowledge displays adequate knowledge of current events               Assessment/Plan:       Diagnoses and all orders for this visit:    SHARA (generalized anxiety disorder)  -     clonazePAM (KlonoPIN) 1 mg tablet;  Take 1 tablet (1 mg total) by mouth daily at bedtime    MDD (major depressive disorder), recurrent severe, without psychosis ([facility] [address] )  -     citalopram (CeleXA) 10 mg tablet; Take 1 tablet (10 mg total) by mouth daily    Other orders  -     methylPREDNISolone 4 MG tablet therapy pack; Take as directed per package instructions  -     methocarbamol (ROBAXIN) 500 mg tablet; Take 500 mg by mouth 4 (four) times a day as needed  -     ergocalciferol (VITAMIN D2) 50,000 units            Treatment Recommendations- Risks Benefits      Immediate Medical/Psychiatric/Psychotherapy Treatments and Any Precautions: decrease Citalopram to 10 mg daily     Risks, Benefits And Possible Side Effects Of Medications:  {PSYCH RISK, BENEFITS AND POSSIBLE SIDE EFFECTS (Optional):15984    Controlled Medication Discussion: Discussed with patient Black Box warning on concurrent use of benzodiazepines and opioid medications including sedation, respiratory depression, coma and death  Patient understands the risk of treatment with benzodiazepines in addition to opioids and wants to continue taking those medications  , Discussed with patient the risks of sedation, respiratory depression, impairment of ability to drive and potential for abuse and addiction related to treatment with benzodiazepine medications  The patient understands risk of treatment with benzodiazepine medications, agrees to not drive if feels impaired and agrees to take medications as prescribed  and The patient has been filling controlled prescriptions on time as [facility] to [facility]       Psychotherapy Provided:     Individual psychotherapy provided: Yes  Counseling was provided during the session today for 16 minutes  Medications, treatment progress and treatment plan reviewed with Radha  Medication changes discussed with Radha  Medication education provided to Radha  Goals discussed during in session: continue improvement in anxiety     Recent stressor including health issues discussed with Radha  Coping strategies including compliance with medications, deep/slow breathing, eliminating avoidance, engaging in previously avoided activities, exercising, getting into a good routine, increasing interest in usual activities, increasing motivation, maintain healthy diet, maintain heathy sleeping hygiene and maintain positive attitude reviewed with Radha  Importance of medication and treatment compliance reviewed with Radha  Educated on importance of medication and treatment compliance  Supportive therapy provided  I spent 20 minutes directly with the patient during this visit      [address] acknowledges that she has consented to an online visit or consultation  She understands that the online visit is based solely on information provided by her, and that, in the absence of a face-to-face physical evaluation by the physician, the diagnosis she receives is both limited and provisional in terms of accuracy and completeness  This is not intended to replace a full medical face-to-face evaluation by the physician  Radha Wang understands and accepts these terms

## 2021-05-04 ENCOUNTER — TELEPHONE (OUTPATIENT)
Dept: PSYCHIATRY | Facility: CLINIC | Age: 36
End: 2021-05-04

## 2021-07-10 DIAGNOSIS — F33.2 MDD (MAJOR DEPRESSIVE DISORDER), RECURRENT SEVERE, WITHOUT PSYCHOSIS (HCC): ICD-10-CM

## 2021-07-10 RX ORDER — CITALOPRAM 10 MG/1
TABLET ORAL
Qty: 90 TABLET | Refills: 3 | Status: SHIPPED | OUTPATIENT
Start: 2021-07-10 | End: 2021-08-20 | Stop reason: SDUPTHER

## 2021-08-20 ENCOUNTER — TELEMEDICINE (OUTPATIENT)
Dept: PSYCHIATRY | Facility: CLINIC | Age: 36
End: 2021-08-20
Payer: COMMERCIAL

## 2021-08-20 DIAGNOSIS — F41.1 GAD (GENERALIZED ANXIETY DISORDER): ICD-10-CM

## 2021-08-20 DIAGNOSIS — F33.2 MDD (MAJOR DEPRESSIVE DISORDER), RECURRENT SEVERE, WITHOUT PSYCHOSIS (HCC): Primary | Chronic | ICD-10-CM

## 2021-08-20 PROBLEM — M54.50 LUMBAR BACK PAIN: Status: ACTIVE | Noted: 2021-05-28

## 2021-08-20 PROBLEM — M51.369 DDD (DEGENERATIVE DISC DISEASE), LUMBAR: Status: ACTIVE | Noted: 2021-05-28

## 2021-08-20 PROBLEM — M51.36 DDD (DEGENERATIVE DISC DISEASE), LUMBAR: Status: ACTIVE | Noted: 2021-05-28

## 2021-08-20 PROBLEM — M51.26 LUMBAR DISC HERNIATION: Status: ACTIVE | Noted: 2021-05-28

## 2021-08-20 PROCEDURE — 1036F TOBACCO NON-USER: CPT | Performed by: PSYCHIATRY & NEUROLOGY

## 2021-08-20 PROCEDURE — 99214 OFFICE O/P EST MOD 30 MIN: CPT | Performed by: PSYCHIATRY & NEUROLOGY

## 2021-08-20 PROCEDURE — 90833 PSYTX W PT W E/M 30 MIN: CPT | Performed by: PSYCHIATRY & NEUROLOGY

## 2021-08-20 RX ORDER — MELOXICAM 15 MG/1
15 TABLET ORAL DAILY
COMMUNITY
Start: 2021-05-04 | End: 2022-05-05

## 2021-08-20 RX ORDER — CITALOPRAM 20 MG/1
20 TABLET ORAL DAILY
Qty: 90 TABLET | Refills: 0 | Status: SHIPPED | OUTPATIENT
Start: 2021-08-20 | End: 2021-12-23

## 2021-08-20 RX ORDER — CLONAZEPAM 1 MG/1
1 TABLET ORAL
Qty: 90 TABLET | Refills: 0 | Status: SHIPPED | OUTPATIENT
Start: 2021-08-20 | End: 2022-03-30 | Stop reason: SDUPTHER

## 2021-08-20 NOTE — PSYCH
Virtual Regular Visit    Verification of patient location:    Patient is located in the following state in which I hold an active license [place]      Assessment/Plan:    Problem List Items Addressed This Visit        Other    SHARA (generalized anxiety disorder)    Relevant Medications    citalopram (CeleXA) 20 mg tablet    clonazePAM (KlonoPIN) 1 mg tablet    MDD (major depressive disorder), recurrent severe, without psychosis ([facility] [address] )    Relevant Medications    citalopram (CeleXA) 20 mg tablet                   Reason for visit is   Chief Complaint   Patient presents with    Virtual Regular Visit        Encounter provider Patsy Wallace MD    Provider located at [address] [place] [id number] [phone]      Recent Visits  Date Type Provider Dept   08/20/21 MD Aliza Malone 18 recent visits within past 7 days and meeting all other requirements  Today's Visits  Date Type Provider Dept   08/23/21 Telephone Aliza Bond 18 today's visits and meeting all other requirements  Future Appointments  No visits were found meeting these conditions  Showing future appointments within next 150 days and meeting all other requirements       The patient was identified by name and date of birth  Thana Husbands was informed that this is a telemedicine visit and that the [facility] and patient was informed that this is a secure, HIPAA-compliant platform  She agrees to proceed     My office door was closed  No one else was in the room  She acknowledged consent and understanding of privacy and security of the video platform  The patient has agreed to participate and understands they can discontinue the visit at any time  Patient is aware this is a billable service       Juan Jose GOVEA Paul Shankar is a 31 y o  female with MDD and SHARA  Radha stated that since the decrease on Citalopram to 10 mg daily she has been experiencing more anxiety  She noticed her anxiety more while driving and she feels she needs to get back to her previous maintenance dose more so now that 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 not from home like before due to COVID 19  She agrees to increase Citalopram to 20 mg daily  She denies recent health changes or new medications  Will schedule follow up in 3 months or sooner if needed  HPI     Past Medical History:   Diagnosis Date    Urinary tract infection     Varicella        Past Surgical History:   Procedure Laterality Date    COLONOSCOPY      ESOPHAGOGASTRODUODENOSCOPY  02/17/2012    Diagnostic; Dr Anne Gomez, chronic gastritis    SKIN BIOPSY      TOOTH EXTRACTION         Current Outpatient Medications   Medication Sig Dispense Refill    meloxicam (MOBIC) 15 mg tablet Take 15 mg by mouth daily      citalopram (CeleXA) 20 mg tablet Take 1 tablet (20 mg total) by mouth daily 90 tablet 0    clonazePAM (KlonoPIN) 1 mg tablet Take 1 tablet (1 mg total) by mouth daily at bedtime 90 tablet 0    methocarbamol (ROBAXIN) 500 mg tablet Take 500 mg by mouth 4 (four) times a day as needed      rizatriptan (MAXALT-MLT) 10 MG disintegrating tablet Take 1 tablet (10 mg total) by mouth once as needed for migraine for up to 1 dose 9 tablet 0     No current facility-administered medications for this visit          Allergies   Allergen Reactions    Doxycycline GI Intolerance       Review of Systems         Mood Anxiety   Behavior Compulsive Behavior   Thought Content Disturbing Thoughts, Feelings   General Emotional Problems and Decreased Functioning   Personality Normal   Other Psych Symptoms Normal   Constitutional Negative   ENT Negative   Cardiovascular Negative   Respiratory Negative   Gastrointestinal Negative   Genitourinary Negative Musculoskeletal Negative   Integumentary Negative   Neurological Negative   Endocrine Normal    Other Symptoms Normal              Laboratory Results: No results found for this or any previous visit      Substance Abuse History:  Social History     Substance and Sexual Activity   Drug Use No       Family Psychiatric History:   Family History   Problem Relation Age of Onset    Hypothyroidism Mother     Other Maternal Grandmother         Thyroid disorder    Colon cancer Paternal Grandmother     Other Paternal Grandmother         Thyroid disorder    Colon cancer Paternal Grandfather     Other Paternal Grandfather         Thyroid disorder    Other Maternal Aunt         Thyroid disorder    Other Paternal Aunt         Thyroid disorder    Other Maternal Uncle         Thyroid disorder    Colon cancer Paternal Uncle     Crohn's disease Paternal Uncle     Other Paternal Uncle         Thyroid disorder    Other Family         Benign polyps of the large intestine    No Known Problems Father        The following portions of the patient's history were reviewed and updated as appropriate: allergies, current medications, past family history, past medical history, past social history, past surgical history and problem list     Social History     Socioeconomic History    Marital status: /Civil Union     Spouse name: Not on file    Number of children: Not on file    Years of education: Not on file    Highest education level: Not on file   Occupational History    Not on file   Tobacco Use    Smoking status: Former Smoker     Quit date: 2011     Years since quitting: 10 2    Smokeless tobacco: Never Used   Substance and Sexual Activity    Alcohol use: Yes     Comment: Social drinker    Drug use: No    Sexual activity: Yes     Partners: Female   Other Topics Concern    Not on file   Social History Narrative    Caffeine use    Daily coffee consumption (1 cup/day)    Denied:  History of  Hinduism affiliation:  None    Uses safety equipment - seatbelts     Social Determinants of Health     Financial Resource Strain:     Difficulty of Paying Living Expenses:    Food Insecurity:     Worried About Running Out of Food in the Last Year:     [address] in the Last Year:    Transportation Needs:     Lack of Transportation (Medical):      Lack of Transportation (Non-Medical):    Physical Activity:     Days of Exercise per Week:     Minutes of Exercise per Session:    Stress:     Feeling of Stress :    Social Connections:     Frequency of Communication with Friends and Family:     Frequency of Social Gatherings with Friends and Family:     Attends Hinduism Services:     Active Member of Clubs or Organizations:     Attends Club or Organization Meetings:     Marital Status:    Intimate Partner Violence:     Fear of Current or Ex-Partner:     Emotionally Abused:     Physically Abused:     Sexually Abused:      Social History     Social History Narrative    Caffeine use    Daily coffee consumption (1 cup/day)    Denied:  History of     Hinduism affiliation:  None    Uses safety equipment - seatbelts       Objective:       Mental status:  Appearance calm and cooperative  and adequate hygiene and grooming   Mood dysphoric and anxious   Affect affect was constricted   Speech a normal rate and fluent   Thought Processes coherent/organized and normal thought processes   Hallucinations no hallucinations present    Thought Content no delusions   Abnormal Thoughts no suicidal thoughts  and no homicidal thoughts    Orientation  oriented to person and place and time   Remote Memory short term memory intact and long term memory intact   Attention Span concentration intact   Intellect Appears to be of Average Intelligence   Insight Limited insight   Judgement judgment was limited   Muscle Strength n/a   Language no difficulty naming common objects and no difficulty repeating a phrase    ZikBit of Knowledge displays adequate knowledge of current events               Assessment/Plan:       Diagnoses and all orders for this visit:    MDD (major depressive disorder), recurrent severe, without psychosis ([facility] [address] )  -     citalopram (CeleXA) 20 mg tablet; Take 1 tablet (20 mg total) by mouth daily    SHARA (generalized anxiety disorder)  -     clonazePAM (KlonoPIN) 1 mg tablet; Take 1 tablet (1 mg total) by mouth daily at bedtime    Other orders  -     meloxicam (MOBIC) 15 mg tablet; Take 15 mg by mouth daily            Treatment Recommendations- Risks Benefits      Immediate Medical/Psychiatric/Psychotherapy Treatments and Any Precautions: increase Citalopram to 20 mg po qam     Risks, Benefits And Possible Side Effects Of Medications:  {PSYCH RISK, BENEFITS AND POSSIBLE SIDE EFFECTS (Optional):30710    Controlled Medication Discussion: Discussed with patient Black Box warning on concurrent use of benzodiazepines and opioid medications including sedation, respiratory depression, coma and death  Patient understands the risk of treatment with benzodiazepines in addition to opioids and wants to continue taking those medications  , Discussed with patient the risks of sedation, respiratory depression, impairment of ability to drive and potential for abuse and addiction related to treatment with benzodiazepine medications  The patient understands risk of treatment with benzodiazepine medications, agrees to not drive if feels impaired and agrees to take medications as prescribed  and The patient has been filling controlled prescriptions on time as prescribed to [facility]  program       Psychotherapy Provided:     Individual psychotherapy provided: Yes  Counseling was provided during the session today for 16 minutes  Medications, treatment progress and treatment plan reviewed with Radha  Medication changes discussed with Radha  Medication education provided to Radha    Goals discussed during in session: improve control of anxiety  Recent stressor including COVID-19 issues, job stress, limited support, social difficulties, everyday stressors and ongoing anxiety discussed with Radha  Coping strategies including compliance with medications, contacting a therapist, eliminating avoidance, engaging in previously avoided activities, exercising, getting into a good routine, increasing energy, increasing interest in usual activities, increasing motivation, increasing social interaction, keeping busy at work, maintain healthy diet, maintain heathy sleeping hygiene and maintain positive attitude reviewed with Radha  Importance of medication and treatment compliance reviewed with Radha  Educated on importance of medication and treatment compliance  Importance of follow up with family physician for medical issues reviewed with Radah  Supportive therapy provided  I spent 30 minutes directly with the patient during this visit    [address] verbally agrees to participate in [employer]  Pt is aware that [employer] [address] could be limited without vital signs or the ability to perform a full hands-on physical Helen Breath understands she or the provider may request at any time to terminate the video visit and request the patient to seek care or treatment in person

## 2021-08-20 NOTE — BH TREATMENT PLAN
TREATMENT PLAN (Medication Management Only)        [facility]    Name and Date of Birth:  Brittany Lopes 31 y o  1985  Date of Treatment Plan: August 23, 2021  Diagnosis/Diagnoses:    1  MDD (major depressive disorder), recurrent severe, without psychosis ([facility])    2  SHARA (generalized anxiety disorder)      Strengths/Personal Resources for Self-Care: taking medications as prescribed, ability to adapt to life changes  Area/Areas of need (in own words): anxiety, anxiety symptoms, depression, depressive symptoms  1  Long Term Goal: improve control of anxiety  Target Date:3 months - 11/23/2021  Person/Persons responsible for completion of goal: Cherish  2  Short Term Objective (s) - How will we reach this goal?:   A  Provider new recommended medication/dosage changes and/or continue medication(s): continue current medications as prescribed  B  N/A   C  N/A  Target Date:3 months - 11/23/2021  Person/Persons Responsible for Completion of Goal: Cherish  Progress Towards Goals: continuing treatment  Treatment Modality: medication management every 3 months  Review due 180 days from date of this plan: 6 months - 2/23/2022  Expected length of service: ongoing treatment  My Physician/PA/NP and I have developed this plan together and I agree to work on the goals and objectives  I understand the treatment goals that were developed for my treatment

## 2021-08-23 ENCOUNTER — TELEPHONE (OUTPATIENT)
Dept: PSYCHIATRY | Facility: CLINIC | Age: 36
End: 2021-08-23

## 2021-10-13 ENCOUNTER — TELEPHONE (OUTPATIENT)
Dept: OBGYN CLINIC | Facility: CLINIC | Age: 36
End: 2021-10-13

## 2021-11-05 ENCOUNTER — ANNUAL EXAM (OUTPATIENT)
Dept: OBGYN CLINIC | Facility: CLINIC | Age: 36
End: 2021-11-05
Payer: COMMERCIAL

## 2021-11-05 VITALS
HEIGHT: 65 IN | DIASTOLIC BLOOD PRESSURE: 80 MMHG | BODY MASS INDEX: 21.29 KG/M2 | WEIGHT: 127.8 LBS | SYSTOLIC BLOOD PRESSURE: 120 MMHG

## 2021-11-05 DIAGNOSIS — Z01.419 ENCOUNTER FOR ANNUAL ROUTINE GYNECOLOGICAL EXAMINATION: Primary | ICD-10-CM

## 2021-11-05 DIAGNOSIS — N92.6 IRREGULAR MENSES: ICD-10-CM

## 2021-11-05 DIAGNOSIS — R10.32 LLQ PAIN: ICD-10-CM

## 2021-11-05 PROCEDURE — 3008F BODY MASS INDEX DOCD: CPT | Performed by: OBSTETRICS & GYNECOLOGY

## 2021-11-05 PROCEDURE — 99395 PREV VISIT EST AGE 18-39: CPT | Performed by: OBSTETRICS & GYNECOLOGY

## 2021-11-05 PROCEDURE — 1036F TOBACCO NON-USER: CPT | Performed by: OBSTETRICS & GYNECOLOGY

## 2021-11-09 ENCOUNTER — TELEPHONE (OUTPATIENT)
Dept: PSYCHIATRY | Facility: CLINIC | Age: 36
End: 2021-11-09

## 2021-11-11 ENCOUNTER — ULTRASOUND (OUTPATIENT)
Dept: OBGYN CLINIC | Facility: CLINIC | Age: 36
End: 2021-11-11
Payer: COMMERCIAL

## 2021-11-11 DIAGNOSIS — R10.32 LLQ PAIN: Primary | ICD-10-CM

## 2021-11-11 PROCEDURE — 76830 TRANSVAGINAL US NON-OB: CPT | Performed by: OBSTETRICS & GYNECOLOGY

## 2021-11-12 ENCOUNTER — TELEPHONE (OUTPATIENT)
Dept: OBGYN CLINIC | Facility: CLINIC | Age: 36
End: 2021-11-12

## 2021-11-16 ENCOUNTER — TELEPHONE (OUTPATIENT)
Dept: PSYCHIATRY | Facility: CLINIC | Age: 36
End: 2021-11-16

## 2021-12-23 DIAGNOSIS — F33.2 MDD (MAJOR DEPRESSIVE DISORDER), RECURRENT SEVERE, WITHOUT PSYCHOSIS (HCC): Chronic | ICD-10-CM

## 2021-12-23 RX ORDER — CITALOPRAM 20 MG/1
TABLET ORAL
Qty: 90 TABLET | Refills: 3 | Status: SHIPPED | OUTPATIENT
Start: 2021-12-23

## 2022-02-02 ENCOUNTER — ULTRASOUND (OUTPATIENT)
Dept: OBGYN CLINIC | Facility: CLINIC | Age: 37
End: 2022-02-02
Payer: COMMERCIAL

## 2022-02-02 DIAGNOSIS — N83.202 LEFT OVARIAN CYST: Primary | ICD-10-CM

## 2022-02-02 PROCEDURE — 76830 TRANSVAGINAL US NON-OB: CPT | Performed by: OBSTETRICS & GYNECOLOGY

## 2022-02-02 NOTE — PROGRESS NOTES
AMB US Pelvic Non OB    Date/Time: 2/2/2022 7:30 AM  Performed by: Alexandra Ulloa  Authorized by: Korin Nava DO   Universal Protocol:  Patient identity confirmed: verbally with patient      Procedure details:     Technique:  Transvaginal US, Non-OB    Position: lithotomy exam    Uterine findings:     Length (cm): 7 27    Height (cm):  4 33    Width (cm):  4 37    Endometrial stripe: identified      Endometrium thickness (mm):  8 4  Left ovary findings:     Left ovary:  Visualized    Length (cm): 2 98    Height (cm): 1 53    Width (cm): 2 04  Right ovary findings:     Right ovary:  Visualized    Length (cm): 3 25    Height (cm): 2 08    Width (cm): 3 37  Other findings:     Free pelvic fluid: not identified      Free peritoneal fluid: not identified    Post-Procedure Details:     Impression:  Anteverted uterus and bilateral ovaries appear within normal limits  No free fluid  Tolerance: Tolerated well, no immediate complications    Complications: no complications    Additional Procedure Comments:      [employer] F8 E8C-RS transvaginal transducer Serial # [id number] was used to perform the examination today and subsequently followed with high level disinfection utilizing Trophon EPR procedure  [facility] at[facility]     [id number] [facility] [address] [facility]  [address] [place] [address] [facility]  Phone:  [phone]  Fax:  [phone]

## 2022-03-30 ENCOUNTER — TELEMEDICINE (OUTPATIENT)
Dept: PSYCHIATRY | Facility: CLINIC | Age: 37
End: 2022-03-30
Payer: COMMERCIAL

## 2022-03-30 DIAGNOSIS — F41.1 GAD (GENERALIZED ANXIETY DISORDER): ICD-10-CM

## 2022-03-30 DIAGNOSIS — F33.2 MDD (MAJOR DEPRESSIVE DISORDER), RECURRENT SEVERE, WITHOUT PSYCHOSIS (HCC): Primary | ICD-10-CM

## 2022-03-30 PROCEDURE — 1036F TOBACCO NON-USER: CPT | Performed by: PSYCHIATRY & NEUROLOGY

## 2022-03-30 PROCEDURE — 99213 OFFICE O/P EST LOW 20 MIN: CPT | Performed by: PSYCHIATRY & NEUROLOGY

## 2022-03-30 PROCEDURE — 90833 PSYTX W PT W E/M 30 MIN: CPT | Performed by: PSYCHIATRY & NEUROLOGY

## 2022-03-30 RX ORDER — CLONAZEPAM 1 MG/1
1 TABLET ORAL
Qty: 90 TABLET | Refills: 0 | Status: SHIPPED | OUTPATIENT
Start: 2022-03-30

## 2022-03-30 NOTE — BH TREATMENT PLAN
TREATMENT PLAN (Medication Management Only)        [facility]    Name and Date of Birth:  Pricila Hanley 31 y o  1985  Date of Treatment Plan: March 30, 2022  Diagnosis/Diagnoses:    1  MDD (major depressive disorder), recurrent severe, without psychosis ([facility] [address] )    2  SHARA (generalized anxiety disorder)      Strengths/Personal Resources for Self-Care: taking medications as prescribed, ability to communicate needs  Area/Areas of need (in own words): anxiety, anxiety symptoms, depression, depressive symptoms  1  Long Term Goal: continue improvement in depression  Target Date:6 months - 9/30/2022  Person/Persons responsible for completion of goal: Cherish  2  Short Term Objective (s) - How will we reach this goal?:   A  Provider new recommended medication/dosage changes and/or continue medication(s): continue current medications as prescribed  B  N/A   C  N/A  Target Date:6 months - 9/30/2022  Person/Persons Responsible for Completion of Goal: Cherish  Progress Towards Goals: continuing treatment  Treatment Modality: medication management every 6 months  Review due 180 days from date of this plan: 6 months - 9/30/2022  Expected length of service: ongoing treatment  My Physician/PA/NP and I have developed this plan together and I agree to work on the goals and objectives  I understand the treatment goals that were developed for my treatment

## 2022-03-30 NOTE — PSYCH
Virtual Regular Visit    Verification of patient location:    Patient is located in the following state in which I hold an active license [place]      Assessment/Plan:    Problem List Items Addressed This Visit        Other    SHARA (generalized anxiety disorder)    MDD (major depressive disorder), recurrent severe, without psychosis ([facility] [address] ) - Primary                 Reason for visit is   Chief Complaint   Patient presents with    Virtual Regular Visit        Encounter provider Micaela Cornelius MD    Provider located at [address] [address] [phone]      Recent Visits  No visits were found meeting these conditions  Showing recent visits within past 7 days and meeting all other requirements  Today's Visits  Date Type Provider Dept   03/30/22 Barrett Brumfield, MD [facility] today's visits and meeting all other requirements  Future Appointments  No visits were found meeting these conditions  Showing future appointments within next 150 days and meeting all other requirements       The patient was identified by name and date of birth  Johnnyrose Mcrae was informed that this is a telemedicine visit and that the visit is being conducted throughEpic Embedded and patient was informed this is a secure, HIPAA-complaint platform  She agrees to proceed     My office door was closed  No one else was in the room  She acknowledged consent and understanding of privacy and security of the video platform  The patient has agreed to participate and understands they can discontinue the visit at any time  Patient is aware this is a billable service  Renetta Esposito is a 31 y o  female with MDD and SHARA   Radha stated that since the decrease on Citalopram to 10 mg daily she has been experiencing more anxiety   She noticed her anxiety more while driving and she felt the need to get back to her previous maintenance dose  She had to increase Citalopram to 20 mg daily  She denies recent health changes or new medications  She has been dealing with low back pain and she was found to have stenosis and arthritis  She received epidural injection and has helped  Will schedule follow up in 6 months or sooner if needed           HPI     Past Medical History:   Diagnosis Date    Urinary tract infection     Varicella        Past Surgical History:   Procedure Laterality Date    COLONOSCOPY      ESOPHAGOGASTRODUODENOSCOPY  02/17/2012    Diagnostic; Dr Daryl Cuellar, chronic gastritis    LUMBAR EPIDURAL INJECTION  05/28/2021    SKIN BIOPSY      TOOTH EXTRACTION         Current Outpatient Medications   Medication Sig Dispense Refill    citalopram (CeleXA) 20 mg tablet TAKE 1 TABLET DAILY 90 tablet 3    clonazePAM (KlonoPIN) 1 mg tablet Take 1 tablet (1 mg total) by mouth daily at bedtime 90 tablet 0    meloxicam (MOBIC) 15 mg tablet Take 15 mg by mouth daily      methocarbamol (ROBAXIN) 500 mg tablet Take 500 mg by mouth 4 (four) times a day as needed      rizatriptan (MAXALT-MLT) 10 MG disintegrating tablet Take 1 tablet (10 mg total) by mouth once as needed for migraine for up to 1 dose 9 tablet 0     No current facility-administered medications for this visit          Allergies   Allergen Reactions    Doxycycline GI Intolerance       Review of Systems     Mood Anxiety and Depression   Behavior Normal    Thought Content Disturbing Thoughts, Feelings   General Emotional Problems and Decreased Functioning   Personality Normal   Other Psych Symptoms Normal   Constitutional Negative   ENT Negative   Cardiovascular Negative   Respiratory Negative   Gastrointestinal Negative   Genitourinary Negative   Musculoskeletal Negative   Integumentary Negative   Neurological Negative   Endocrine Normal    Other Symptoms Normal              Laboratory Results: No results found for this or any previous visit      Substance Abuse History:  Social History     Substance and Sexual Activity   Drug Use No       Family Psychiatric History:   Family History   Problem Relation Age of Onset    Hypothyroidism Mother     Other Maternal Grandmother         Thyroid disorder    Colon cancer Paternal Grandmother     Other Paternal Grandmother         Thyroid disorder    Colon cancer Paternal Grandfather     Other Paternal Grandfather         Thyroid disorder    Other Maternal Aunt         Thyroid disorder    Other Paternal Aunt         Thyroid disorder    Other Maternal Uncle         Thyroid disorder    Colon cancer Paternal Uncle     Crohn's disease Paternal Uncle     Other Paternal Uncle         Thyroid disorder    Other Family         Benign polyps of the large intestine    No Known Problems Father        The following portions of the patient's history were reviewed and updated as appropriate: allergies, current medications, past family history, past medical history, past social history, past surgical history and problem list     Social History     Socioeconomic History    Marital status: /Civil Union     Spouse name: Not on file    Number of children: Not on file    Years of education: Not on file    Highest education level: Not on file   Occupational History    Not on file   Tobacco Use    Smoking status: Former Smoker     Quit date: 2011     Years since quitting: 10 8    Smokeless tobacco: Never Used   Vaping Use    Vaping Use: Never used   Substance and Sexual Activity    Alcohol use: Yes     Comment: Social drinker    Drug use: No    Sexual activity: Yes     Partners: Female   Other Topics Concern    Not on file   Social History Narrative    Caffeine use    Daily coffee consumption (1 cup/day)    Denied:  History of     Latter day affiliation:  None    Uses safety equipment - seatbelts     Social Determinants of Health     Financial Resource Strain: Not on file   Food Insecurity: Not on file   Transportation Needs: Not on file   Physical Activity: Not on file   Stress: Not on file   Social Connections: Not on file   Intimate Partner Violence: Not on file   Housing Stability: Not on file     Social History     Social History Narrative    Caffeine use    Daily coffee consumption (1 cup/day)    Denied:  History of     Rastafari affiliation:  None    Uses safety equipment - seatbelts       Objective:       Mental status:  Appearance calm and cooperative , adequate hygiene and grooming and good eye contact    Mood dysphoric   Affect affect was constricted   Speech a normal rate and fluent   Thought Processes coherent/organized and normal thought processes   Hallucinations no hallucinations present    Thought Content no delusions   Abnormal Thoughts no suicidal thoughts  and no homicidal thoughts    Orientation  oriented to person and place and time   Remote Memory short term memory intact and long term memory intact   Attention Span concentration impaired   Intellect Appears to be of Average Intelligence   Insight Limited insight   Judgement judgment was limited   Muscle Strength n/a   Language no difficulty naming common objects and no difficulty repeating a phrase    Fund of Knowledge displays adequate knowledge of current events, adequate fund of knowledge regarding past history and adequate fund of knowledge regarding vocabulary                Assessment/Plan:       Diagnoses and all orders for this visit:    MDD (major depressive disorder), recurrent severe, without psychosis ([facility] )    SHARA (generalized anxiety disorder)            Treatment Recommendations- Risks Benefits      Immediate Medical/Psychiatric/Psychotherapy Treatments and Any Precautions: continue current treatment     Risks, Benefits And Possible Side Effects Of Medications:  {PSYCH RISK, BENEFITS AND POSSIBLE SIDE EFFECTS (Optional):34069    Controlled Medication Discussion: Discussed with patient Black Box warning on concurrent use of benzodiazepines and opioid medications including sedation, respiratory depression, coma and death  Patient understands the risk of treatment with benzodiazepines in addition to opioids and wants to continue taking those medications  , Discussed with patient the risks of sedation, respiratory depression, impairment of ability to drive and potential for abuse and addiction related to treatment with benzodiazepine medications  The patient understands risk of treatment with benzodiazepine medications, agrees to not drive if feels impaired and agrees to take medications as prescribed  and The patient has been filling controlled prescriptions on time as prescribed to [facility]       Psychotherapy Provided: Individual psychotherapy provided  Individual psychotherapy provided: Yes  Counseling was provided during the session today for 16 minutes  Medications, treatment progress and treatment plan reviewed with Radha  Medication changes discussed with Radha  Medication education provided to Radha  Goals discussed during in session: improve control of anxiety  Recent stressor including COVID-19 issues, job stress, limited support, social difficulties, everyday stressors and ongoing anxiety discussed with Radha  Coping strategies including compliance with medications, contacting a therapist, eliminating avoidance, engaging in previously avoided activities, exercising, getting into a good routine, increasing energy, increasing interest in usual activities, increasing motivation, increasing social interaction, keeping busy at work, maintain healthy diet, maintain heathy sleeping hygiene and maintain positive attitude reviewed with Radha  Importance of medication and treatment compliance reviewed with Radha  Educated on importance of medication and treatment compliance    Importance of follow up with family physician for medical issues reviewed with Radha  Supportive therapy provided                       I spent 30 minutes directly with the patient during this visit    [address] verbally agrees to participate in [employer]  Pt is aware that [employer] could be limited without vital signs or the ability to perform a full hands-on physical Kirstin Owens understands she or the provider may request at any time to terminate the video visit and request the patient to seek care or treatment in person

## 2022-10-03 ENCOUNTER — TELEMEDICINE (OUTPATIENT)
Dept: PSYCHIATRY | Facility: CLINIC | Age: 37
End: 2022-10-03
Payer: COMMERCIAL

## 2022-10-03 DIAGNOSIS — F33.2 MDD (MAJOR DEPRESSIVE DISORDER), RECURRENT SEVERE, WITHOUT PSYCHOSIS (HCC): Primary | ICD-10-CM

## 2022-10-03 DIAGNOSIS — F41.1 GAD (GENERALIZED ANXIETY DISORDER): ICD-10-CM

## 2022-10-03 PROCEDURE — 99213 OFFICE O/P EST LOW 20 MIN: CPT | Performed by: PSYCHIATRY & NEUROLOGY

## 2022-10-03 RX ORDER — CITALOPRAM 20 MG/1
20 TABLET ORAL DAILY
Qty: 90 TABLET | Refills: 3 | Status: SHIPPED | OUTPATIENT
Start: 2022-10-03

## 2022-10-03 RX ORDER — CLONAZEPAM 1 MG/1
1 TABLET ORAL
Qty: 90 TABLET | Refills: 1 | Status: SHIPPED | OUTPATIENT
Start: 2022-10-03

## 2022-10-03 NOTE — PSYCH
Virtual Regular Visit    Verification of patient location:    Patient is located in the following state in which I hold an active license [place]      Assessment/Plan:    Problem List Items Addressed This Visit        Other    SHARA (generalized anxiety disorder)    MDD (major depressive disorder), recurrent severe, without psychosis ([facility] [address] ) - Primary                   Reason for visit is   Chief Complaint   Patient presents with    Virtual Regular Visit        Encounter provider Yoel Rosenbaum MD    Provider located at [address] [address] [phone]      Recent Visits  No visits were found meeting these conditions  Showing recent visits within past 7 days and meeting all other requirements  Future Appointments  No visits were found meeting these conditions  Showing future appointments within next 150 days and meeting all other requirements       The patient was identified by name and date of birth  Nataliiamarla Harry was informed that this is a telemedicine visit and that the visit is being conducted throughEpic Embedded and patient was informed this is a secure, HIPAA-complaint platform  She agrees to proceed     My office door was closed  No one else was in the room  She acknowledged consent and understanding of privacy and security of the video platform  The patient has agreed to participate and understands they can discontinue the visit at any time  Patient is aware this is a billable service  Wenceslao Dumont is a 30 y o  female with MDD and SHARA  Patient remains compliant with medications and denies side effects   She denies recent health changes or new medications  She is reporting stable mood  She has normal appetite and energy levels  She denies feeling anxious  She stated work is stressful but has been going well  She agrees to continue current treatment as prescribed   Will schedule follow up in 6 months  HPI     Past Medical History:   Diagnosis Date    Urinary tract infection     Varicella        Past Surgical History:   Procedure Laterality Date    COLONOSCOPY      ESOPHAGOGASTRODUODENOSCOPY  02/17/2012    Diagnostic; Dr Radha Betts, chronic gastritis    LUMBAR EPIDURAL INJECTION  05/28/2021    SKIN BIOPSY      TOOTH EXTRACTION         Current Outpatient Medications   Medication Sig Dispense Refill    citalopram (CeleXA) 20 mg tablet TAKE 1 TABLET DAILY 90 tablet 3    clonazePAM (KlonoPIN) 1 mg tablet Take 1 tablet (1 mg total) by mouth daily at bedtime 90 tablet 0    meloxicam (MOBIC) 15 mg tablet Take 15 mg by mouth daily      methocarbamol (ROBAXIN) 500 mg tablet Take 500 mg by mouth 4 (four) times a day as needed      rizatriptan (MAXALT-MLT) 10 MG disintegrating tablet Take 1 tablet (10 mg total) by mouth once as needed for migraine for up to 1 dose 9 tablet 0     No current facility-administered medications for this visit  Allergies   Allergen Reactions    Doxycycline GI Intolerance       Review of Systems     Mood Anxiety and Depression   Behavior Normal    Thought Content Normal   General Emotional Problems and Decreased Functioning   Personality Change in Personality   Other Psych Symptoms Normal   Constitutional Negative   ENT Negative   Cardiovascular Negative   Respiratory Negative   Gastrointestinal Negative   Genitourinary Negative   Musculoskeletal Negative   Integumentary Negative   Neurological Negative   Endocrine Normal    Other Symptoms Normal              Laboratory Results: No results found for this or any previous visit      Substance Abuse History:  Social History     Substance and Sexual Activity   Drug Use No       Family Psychiatric History:   Family History   Problem Relation Age of Onset    Hypothyroidism Mother     Other Maternal Grandmother         Thyroid disorder    Colon cancer Paternal Grandmother     Other Paternal Grandmother         Thyroid disorder    Colon cancer Paternal Grandfather     Other Paternal Grandfather         Thyroid disorder    Other Maternal Aunt         Thyroid disorder    Other Paternal Aunt         Thyroid disorder    Other Maternal Uncle         Thyroid disorder    Colon cancer Paternal Uncle     Crohn's disease Paternal Uncle     Other Paternal Uncle         Thyroid disorder    Other Family         Benign polyps of the large intestine    No Known Problems Father        The following portions of the patient's history were reviewed and updated as appropriate: allergies, current medications, past family history, past medical history, past social history, past surgical history and problem list     Social History     Socioeconomic History    Marital status: /Civil Union     Spouse name: Not on file    Number of children: Not on file    Years of education: Not on file    Highest education level: Not on file   Occupational History    Not on file   Tobacco Use    Smoking status: Former Smoker     Quit date: 2011     Years since quittin 3    Smokeless tobacco: Never Used   Vaping Use    Vaping Use: Never used   Substance and Sexual Activity    Alcohol use: Yes     Comment: Social drinker    Drug use: No    Sexual activity: Yes     Partners: Female   Other Topics Concern    Not on file   Social History Narrative    Caffeine use    Daily coffee consumption (1 cup/day)    Denied:  History of     Judaism affiliation:  None    Uses safety equipment - seatbelts     Social Determinants of Health     Financial Resource Strain: Not on file   Food Insecurity: Not on file   Transportation Needs: Not on file   Physical Activity: Not on file   Stress: Not on file   Social Connections: Not on file   Intimate Partner Violence: Not on file   Housing Stability: Not on file     Social History     Social History Narrative    Caffeine use    Daily coffee consumption (1 cup/day) Denied:  History of     Holiness affiliation:  None    Uses safety equipment - seatbelts       Objective:       Mental status:  Appearance calm and cooperative , adequate hygiene and grooming and good eye contact    Mood dysphoric   Affect affect was constricted   Speech a normal rate and fluent   Thought Processes coherent/organized and normal thought processes   Hallucinations no hallucinations present    Thought Content no delusions   Abnormal Thoughts no suicidal thoughts  and no homicidal thoughts    Orientation  oriented to person and place and time   Remote Memory short term memory intact and long term memory intact   Attention Span concentration intact   Intellect Appears to be of Average Intelligence   Insight Limited insight   Judgement judgment was limited   Muscle Strength Muscle strength and tone were normal and Normal gait    Language no difficulty naming common objects and no difficulty repeating a phrase    Fund of Knowledge displays adequate knowledge of current events, adequate fund of knowledge regarding past history and adequate fund of knowledge regarding vocabulary      Assessment/Plan:       Diagnoses and all orders for this visit:    MDD (major depressive disorder), recurrent severe, without psychosis ([facility] )  -     citalopram (CeleXA) 20 mg tablet; Take 1 tablet (20 mg total) by mouth daily    SHARA (generalized anxiety disorder)  -     clonazePAM (KlonoPIN) 1 mg tablet;  Take 1 tablet (1 mg total) by mouth daily at bedtime            Treatment Recommendations- Risks Benefits      Immediate Medical/Psychiatric/Psychotherapy Treatments and Any Precautions: continue current treatment     Risks, Benefits And Possible Side Effects Of Medications:  {PSYCH RISK, BENEFITS AND POSSIBLE SIDE EFFECTS (Optional):56157    Controlled Medication Discussion: Discussed with patient Black Box warning on concurrent use of benzodiazepines and opioid medications including sedation, respiratory depression, coma and death  Patient understands the risk of treatment with benzodiazepines in addition to opioids and wants to continue taking those medications  , Discussed with patient the risks of sedation, respiratory depression, impairment of ability to drive and potential for abuse and addiction related to treatment with benzodiazepine medications  The patient understands risk of treatment with benzodiazepine medications, agrees to not drive if feels impaired and agrees to take medications as prescribed   and The patient has been filling controlled prescriptions on time as prescribed to [facility]       Psychotherapy Provided: No                         I spent 20 minutes directly with the patient during this visit

## 2022-10-03 NOTE — BH TREATMENT PLAN
TREATMENT PLAN (Medication Management Only)        [facility]    Name and Date of Birth:  Gabby Cox 30 y o  1985  Date of Treatment Plan: October 3, 2022  Diagnosis/Diagnoses:    1  MDD (major depressive disorder), recurrent severe, without psychosis ([facility] [address] )    2  SHARA (generalized anxiety disorder)      Strengths/Personal Resources for Self-Care: taking medications as prescribed, ability to communicate needs  Area/Areas of need (in own words): anxiety, anxiety symptoms, depression, depressive symptoms  1  Long Term Goal: continue improvement in depression  Target Date:6 months - 4/3/2023  Person/Persons responsible for completion of goal: Cherish  2  Short Term Objective (s) - How will we reach this goal?:   A  Provider new recommended medication/dosage changes and/or continue medication(s): continue current medications as prescribed  B  N/A   C  N/A  Target Date:6 months - 4/3/2023  Person/Persons Responsible for Completion of Goal: Cherish  Progress Towards Goals: continuing treatment  Treatment Modality: medication management every 6 weeks  Review due 180 days from date of this plan: 6 months - 4/3/2023  Expected length of service: ongoing treatment  My Physician/PA/NP and I have developed this plan together and I agree to work on the goals and objectives  I understand the treatment goals that were developed for my treatment

## 2022-12-01 ENCOUNTER — APPOINTMENT (OUTPATIENT)
Dept: LAB | Age: 37
End: 2022-12-01

## 2022-12-01 DIAGNOSIS — R94.6 NONSPECIFIC ABNORMAL RESULTS OF THYROID FUNCTION STUDY: ICD-10-CM

## 2022-12-01 DIAGNOSIS — E55.9 VITAMIN D DEFICIENCY DISEASE: ICD-10-CM

## 2022-12-01 DIAGNOSIS — Z13.9 SCREENING FOR UNSPECIFIED CONDITION: ICD-10-CM

## 2022-12-01 DIAGNOSIS — E53.9 VITAMIN B-COMPLEX DEFICIENCY: ICD-10-CM

## 2022-12-01 LAB
25(OH)D3 SERPL-MCNC: 17.7 NG/ML (ref 30–100)
ALBUMIN SERPL BCP-MCNC: 3.7 G/DL (ref 3.5–5)
ALP SERPL-CCNC: 50 U/L (ref 46–116)
ALT SERPL W P-5'-P-CCNC: 26 U/L (ref 12–78)
ANION GAP SERPL CALCULATED.3IONS-SCNC: 4 MMOL/L (ref 4–13)
AST SERPL W P-5'-P-CCNC: 19 U/L (ref 5–45)
BILIRUB SERPL-MCNC: 0.82 MG/DL (ref 0.2–1)
BUN SERPL-MCNC: 9 MG/DL (ref 5–25)
CALCIUM SERPL-MCNC: 9.7 MG/DL (ref 8.3–10.1)
CHLORIDE SERPL-SCNC: 108 MMOL/L (ref 96–108)
CHOLEST SERPL-MCNC: 280 MG/DL
CO2 SERPL-SCNC: 27 MMOL/L (ref 21–32)
CREAT SERPL-MCNC: 0.74 MG/DL (ref 0.6–1.3)
ERYTHROCYTE [DISTWIDTH] IN BLOOD BY AUTOMATED COUNT: 12.7 % (ref 11.6–15.1)
GFR SERPL CREATININE-BSD FRML MDRD: 105 ML/MIN/1.73SQ M
GLUCOSE P FAST SERPL-MCNC: 86 MG/DL (ref 65–99)
HCT VFR BLD AUTO: 40.8 % (ref 34.8–46.1)
HDLC SERPL-MCNC: 146 MG/DL
HGB BLD-MCNC: 12.9 G/DL (ref 11.5–15.4)
LDLC SERPL CALC-MCNC: 116 MG/DL (ref 0–100)
MCH RBC QN AUTO: 30.6 PG (ref 26.8–34.3)
MCHC RBC AUTO-ENTMCNC: 31.6 G/DL (ref 31.4–37.4)
MCV RBC AUTO: 97 FL (ref 82–98)
NONHDLC SERPL-MCNC: 134 MG/DL
PLATELET # BLD AUTO: 281 THOUSANDS/UL (ref 149–390)
PMV BLD AUTO: 10.1 FL (ref 8.9–12.7)
POTASSIUM SERPL-SCNC: 5 MMOL/L (ref 3.5–5.3)
PROT SERPL-MCNC: 7.9 G/DL (ref 6.4–8.4)
RBC # BLD AUTO: 4.22 MILLION/UL (ref 3.81–5.12)
SODIUM SERPL-SCNC: 139 MMOL/L (ref 135–147)
T3 SERPL-MCNC: 1 NG/ML (ref 0.6–1.8)
T4 FREE SERPL-MCNC: 0.87 NG/DL (ref 0.76–1.46)
TRIGL SERPL-MCNC: 89 MG/DL
TSH SERPL DL<=0.05 MIU/L-ACNC: 1.49 UIU/ML (ref 0.45–4.5)
VIT B12 SERPL-MCNC: 298 PG/ML (ref 100–900)
WBC # BLD AUTO: 4.44 THOUSAND/UL (ref 4.31–10.16)

## 2023-04-04 ENCOUNTER — TELEMEDICINE (OUTPATIENT)
Dept: PSYCHIATRY | Facility: CLINIC | Age: 38
End: 2023-04-04

## 2023-04-04 DIAGNOSIS — F33.2 MDD (MAJOR DEPRESSIVE DISORDER), RECURRENT SEVERE, WITHOUT PSYCHOSIS (HCC): Primary | ICD-10-CM

## 2023-04-04 DIAGNOSIS — F41.1 GAD (GENERALIZED ANXIETY DISORDER): ICD-10-CM

## 2023-04-04 PROBLEM — M25.512 LEFT SHOULDER PAIN: Status: ACTIVE | Noted: 2022-11-01

## 2023-04-04 RX ORDER — CLONAZEPAM 1 MG/1
1 TABLET ORAL
Qty: 90 TABLET | Refills: 1 | Status: SHIPPED | OUTPATIENT
Start: 2023-04-04

## 2023-04-04 RX ORDER — ERGOCALCIFEROL 1.25 MG/1
CAPSULE ORAL
COMMUNITY
Start: 2023-03-29

## 2023-04-04 RX ORDER — CITALOPRAM 20 MG/1
20 TABLET ORAL DAILY
Qty: 90 TABLET | Refills: 1 | Status: SHIPPED | OUTPATIENT
Start: 2023-04-04

## 2023-04-04 NOTE — BH TREATMENT PLAN
TREATMENT PLAN (Medication Management Only)        [facility]    Name and Date of Birth:  Binta Hassan 30 y o  1985  Date of Treatment Plan: April 4, 2023  Diagnosis/Diagnoses:    1  MDD (major depressive disorder), recurrent severe, without psychosis ([facility] [address] )    2  SHARA (generalized anxiety disorder)      Strengths/Personal Resources for Self-Care: taking medications as prescribed, ability to communicate needs  Area/Areas of need (in own words): anxiety, anxiety symptoms, depression, depressive symptoms  1  Long Term Goal: continue improvement in depression  Target Date:6 months - 10/4/2023  Person/Persons responsible for completion of goal: Cherish  2  Short Term Objective (s) - How will we reach this goal?:   A  Provider new recommended medication/dosage changes and/or continue medication(s): continue current medications as prescribed  B  N/A   C  N/A  Target Date:6 months - 10/4/2023  Person/Persons Responsible for Completion of Goal: Cherish  Progress Towards Goals: continuing treatment  Treatment Modality: medication management every 6 months  Review due 180 days from date of this plan: 6 months - 10/4/2023  Expected length of service: ongoing treatment  My Physician/PA/NP and I have developed this plan together and I agree to work on the goals and objectives  I understand the treatment goals that were developed for my treatment

## 2023-04-04 NOTE — PSYCH
Virtual Regular Visit    Verification of patient location:    Patient is located in the following state in which I hold an active license [place]      Assessment/Plan:    Problem List Items Addressed This Visit        Other    SHARA (generalized anxiety disorder)    MDD (major depressive disorder), recurrent severe, without psychosis ([facility] [address] ) - Primary                Reason for visit is   Chief Complaint   Patient presents with   • Virtual Regular Visit        Encounter provider Darlyn Joseph MD    Provider located at [address] [address] [phone]      Recent Visits  No visits were found meeting these conditions  Showing recent visits within past 7 days and meeting all other requirements  Today's Visits  Date Type Provider Dept   04/04/23 MD Aliza Pop 18 today's visits and meeting all other requirements  Future Appointments  No visits were found meeting these conditions  Showing future appointments within next 150 days and meeting all other requirements       The patient was identified by name and date of birth  Gildardo Mirza was informed that this is a telemedicine visit and that [facility]  She agrees to proceed     My office door was closed  No one else was in the room  She acknowledged consent and understanding of privacy and security of the video platform  The patient has agreed to participate and understands they can discontinue the visit at any time  Patient is aware this is a billable service  Bianca Estevez is a 30 y o  female with MDD and SHARA   Patient remains compliant with medications and denies side effects   She denies recent health changes or new medications  She is reporting stable mood  She denies feeling anxious    She stated work is stressful but she has been able to manage the stress well  She agrees to continue current treatment as prescribed  Will schedule follow up in 6 months          HPI     Past Medical History:   Diagnosis Date   • Urinary tract infection    • Varicella        Past Surgical History:   Procedure Laterality Date   • COLONOSCOPY     • ESOPHAGOGASTRODUODENOSCOPY  02/17/2012    Diagnostic; Dr Corinne Roger, chronic gastritis   • LUMBAR EPIDURAL INJECTION  05/28/2021   • SKIN BIOPSY     • TOOTH EXTRACTION         Current Outpatient Medications   Medication Sig Dispense Refill   • citalopram (CeleXA) 20 mg tablet Take 1 tablet (20 mg total) by mouth daily 90 tablet 3   • clonazePAM (KlonoPIN) 1 mg tablet Take 1 tablet (1 mg total) by mouth daily at bedtime 90 tablet 1   • ergocalciferol (VITAMIN D2) 50,000 units      • meloxicam (MOBIC) 15 mg tablet Take 15 mg by mouth daily     • methocarbamol (ROBAXIN) 500 mg tablet Take 500 mg by mouth 4 (four) times a day as needed     • rizatriptan (MAXALT-MLT) 10 MG disintegrating tablet Take 1 tablet (10 mg total) by mouth once as needed for migraine for up to 1 dose 9 tablet 0     No current facility-administered medications for this visit          Allergies   Allergen Reactions   • Doxycycline GI Intolerance       Review of Systems     Mood Anxiety and Depression   Behavior Normal    Thought Content Disturbing Thoughts, Feelings   General Emotional Problems and Decreased Functioning   Personality Normal   Other Psych Symptoms Normal   Constitutional Negative   ENT Negative   Cardiovascular Negative   Respiratory Negative   Gastrointestinal Negative   Genitourinary Negative   Musculoskeletal Negative   Integumentary Negative   Neurological Negative   Endocrine Normal    Other Symptoms Normal        Laboratory Results:   Recent Labs (last 12 months):   Appointment on 12/01/2022   Component Date Value   • Vitamin B-12 12/01/2022 298    • WBC 12/01/2022 4 44    • RBC 12/01/2022 4 22    • Hemoglobin 12/01/2022 12 9 • Hematocrit 12/01/2022 40 8    • MCV 12/01/2022 97    • MCH 12/01/2022 30 6    • MCHC 12/01/2022 31 6    • RDW 12/01/2022 12 7    • Platelets 23/06/0572 281    • MPV 12/01/2022 10 1    • Cholesterol 12/01/2022 280 (H)    • Triglycerides 12/01/2022 89    • HDL, Direct 12/01/2022 146    • LDL Calculated 12/01/2022 116 (H)    • Non-HDL-Chol (CHOL-HDL) 12/01/2022 134    • Sodium 12/01/2022 139    • Potassium 12/01/2022 5 0    • Chloride 12/01/2022 108    • CO2 12/01/2022 27    • ANION GAP 12/01/2022 4    • BUN 12/01/2022 9    • Creatinine 12/01/2022 0 74    • Glucose, Fasting 12/01/2022 86    • Calcium 12/01/2022 9 7    • AST 12/01/2022 19    • ALT 12/01/2022 26    • Alkaline Phosphatase 12/01/2022 50    • Total Protein 12/01/2022 7 9    • Albumin 12/01/2022 3 7    • Total Bilirubin 12/01/2022 0 82    • eGFR 12/01/2022 105    • TSH 3RD GENERATON 12/01/2022 1 490    • Free T4 12/01/2022 0 87    • T3, Total 12/01/2022 1 00    • Vit D, 25-Hydroxy 12/01/2022 17 7 (L)        Substance Abuse History:  Social History     Substance and Sexual Activity   Drug Use No       Family Psychiatric History:   Family History   Problem Relation Age of Onset   • Hypothyroidism Mother    • Other Maternal Grandmother         Thyroid disorder   • Colon cancer Paternal Grandmother    • Other Paternal Grandmother         Thyroid disorder   • Colon cancer Paternal Grandfather    • Other Paternal Grandfather         Thyroid disorder   • Other Maternal Aunt         Thyroid disorder   • Other Paternal Aunt         Thyroid disorder   • Other Maternal Uncle         Thyroid disorder   • Colon cancer Paternal Uncle    • Crohn's disease Paternal Uncle    • Other Paternal Uncle         Thyroid disorder   • Other Family         Benign polyps of the large intestine   • No Known Problems Father        The following portions of the patient's history were reviewed and updated as appropriate: allergies, current medications, past family history, past medical history, past social history, past surgical history and problem list     Social History     Socioeconomic History   • Marital status: /Civil Union     Spouse name: Not on file   • Number of children: Not on file   • Years of education: Not on file   • Highest education level: Not on file   Occupational History   • Not on file   Tobacco Use   • Smoking status: Former     Types: Cigarettes     Quit date: 2011     Years since quittin 8   • Smokeless tobacco: Never   Vaping Use   • Vaping Use: Never used   Substance and Sexual Activity   • Alcohol use: Yes     Comment: Social drinker   • Drug use: No   • Sexual activity: Yes     Partners: Female   Other Topics Concern   • Not on file   Social History Narrative    Caffeine use    Daily coffee consumption (1 cup/day)    Denied:  History of     Worship affiliation:  None    Uses safety equipment - seatbelts     Social Determinants of Health     Financial Resource Strain: Not on file   Food Insecurity: Not on file   Transportation Needs: Not on file   Physical Activity: Not on file   Stress: Not on file   Social Connections: Not on file   Intimate Partner Violence: Not on file   Housing Stability: Not on file     Social History     Social History Narrative    Caffeine use    Daily coffee consumption (1 cup/day)    Denied:  History of     Worship affiliation:  None    Uses safety equipment - seatbelts       Objective:       Mental status:  Appearance calm and cooperative , adequate hygiene and grooming and good eye contact    Mood dysphoric   Affect affect was constricted   Speech a normal rate and fluent   Thought Processes coherent/organized and normal thought processes   Hallucinations no hallucinations present    Thought Content no delusions   Abnormal Thoughts no suicidal thoughts  and no homicidal thoughts    Orientation  oriented to person and place and time   Remote Memory short term memory intact and long term memory intact Attention Span concentration intact   Intellect Appears to be of Average Intelligence   Insight Limited insight   Judgement judgment was limited   Muscle Strength Muscle strength and tone were normal and Normal gait    Language no difficulty naming common objects and no difficulty repeating a phrase    Fund of Knowledge displays adequate knowledge of current events, adequate fund of knowledge regarding past history and adequate fund of knowledge regarding vocabulary                Assessment/Plan:       Diagnoses and all orders for this visit:    MDD (major depressive disorder), recurrent severe, without psychosis ([facility] [address] )    SHARA (generalized anxiety disorder)    Other orders  -     ergocalciferol (VITAMIN D2) 50,000 units            Treatment Recommendations- Risks Benefits      Immediate Medical/Psychiatric/Psychotherapy Treatments and Any Precautions: continue current treatment     Risks, Benefits And Possible Side Effects Of Medications:  {PSYCH RISK, BENEFITS AND POSSIBLE SIDE EFFECTS (Optional):47998    Controlled Medication Discussion: Discussed with patient Black Box warning on concurrent use of benzodiazepines and opioid medications including sedation, respiratory depression, coma and death  Patient understands the risk of treatment with benzodiazepines in addition to opioids and wants to continue taking those medications  , Discussed with patient the risks of sedation, respiratory depression, impairment of ability to drive and potential for abuse and addiction related to treatment with benzodiazepine medications  The patient understands risk of treatment with benzodiazepine medications, agrees to not drive if feels impaired and agrees to take medications as prescribed  and The patient has been filling controlled prescriptions on time as prescribed to [facility]       Psychotherapy Provided: Individual psychotherapy provided     Individual psychotherapy provided: Yes  Counseling was provided during the session today for 16 minutes  Medications, treatment progress and treatment plan reviewed with Radha  Medication education provided to Radha  Goals discussed during in session: continue improvement in anxiety  Coping strategies including compliance with medications, deep/slow breathing, eliminating avoidance, engaging in previously avoided activities, exercising, getting into a good routine, increasing energy, increasing interest in usual activities, increasing motivation, increasing social interaction, keeping busy at work, maintain healthy diet, maintain heathy sleeping hygiene and maintain positive attitude reviewed with Radha  Importance of medication and treatment compliance reviewed with Radha  Educated on importance of medication and treatment compliance  Supportive therapy provided                           Visit Time    Visit Start Time: 9:30  Visit Stop Time: 10:00  Total Visit Duration: 30 minutes

## 2023-04-06 ENCOUNTER — ANNUAL EXAM (OUTPATIENT)
Dept: GYNECOLOGY | Facility: CLINIC | Age: 38
End: 2023-04-06

## 2023-04-06 VITALS
BODY MASS INDEX: 22.73 KG/M2 | HEIGHT: 65 IN | HEART RATE: 70 BPM | SYSTOLIC BLOOD PRESSURE: 106 MMHG | DIASTOLIC BLOOD PRESSURE: 70 MMHG | WEIGHT: 136.44 LBS

## 2023-04-06 DIAGNOSIS — Z01.419 WOMEN'S ANNUAL ROUTINE GYNECOLOGICAL EXAMINATION: Primary | ICD-10-CM

## 2023-04-06 DIAGNOSIS — Z01.419 ENCOUNTER FOR GYNECOLOGICAL EXAMINATION WITH PAPANICOLAOU SMEAR OF CERVIX: ICD-10-CM

## 2023-04-06 NOTE — PROGRESS NOTES
Assessment/Plan   Diagnoses and all orders for this visit:    Women's annual routine gynecological examination    Encounter for gynecological examination with Papanicolaou smear of cervix  -     Liquid-based pap, screening    1  yearly exam- Pap smear done with HPV testing, self breast awareness reviewed  2  previous history of irregular menses/left-sided pelvic pain-denies any complaints  She is can be 3 or 4 days late, but she denies any omer menometrorrhagia  Does sometimes note spotting and can have bleeding up to 8/9 days duration  Currently day 2 through 4 can be heavier, requiring change of pad/tampon at 3-hour intervals  Counseled about menometrorrhagia symptoms, she will call with this  Particularly she will watch for duration of menses and call if they are extended  3  same-sex relationship-contraception not required this time  4  other-Works in  behavioral health at [employer]  My support was given  Previously did work 10 years at [employer]  Follow-up 1 year for yearly exam or as needed  Subjective   Patient ID: Tres Steward is a 30 y o  female  Vitals:    23 [id number]   BP: 106/70   Pulse: 70     Patient was seen today for yearly exam   Please see assessment plan for details        The following portions of the patient's history were reviewed and updated as appropriate: allergies, current medications, past family history, past medical history, past social history, past surgical history and problem list   Past Medical History:   Diagnosis Date   • Depression    • Migraine    • Urinary tract infection    • Varicella      Past Surgical History:   Procedure Laterality Date   • COLONOSCOPY     • ESOPHAGOGASTRODUODENOSCOPY  2012    Diagnostic; Dr Valentino Drain, chronic gastritis   • LUMBAR EPIDURAL INJECTION  2021   • SKIN BIOPSY     • TOOTH EXTRACTION       OB History    Para Term  AB Living   0 0 0 0 0 0   SAB IAB Ectopic Multiple Live Births   0 0 0 0 0 Current Outpatient Medications:   •  citalopram (CeleXA) 20 mg tablet, Take 1 tablet (20 mg total) by mouth daily, Disp: 90 tablet, Rfl: 1  •  clonazePAM (KlonoPIN) 1 mg tablet, Take 1 tablet (1 mg total) by mouth daily at bedtime, Disp: 90 tablet, Rfl: 1  •  ergocalciferol (VITAMIN D2) 50,000 units, , Disp: , Rfl:   •  meloxicam (MOBIC) 15 mg tablet, Take 15 mg by mouth daily, Disp: , Rfl:   •  methocarbamol (ROBAXIN) 500 mg tablet, Take 500 mg by mouth 4 (four) times a day as needed, Disp: , Rfl:   •  rizatriptan (MAXALT-MLT) 10 MG disintegrating tablet, Take 1 tablet (10 mg total) by mouth once as needed for migraine for up to 1 dose, Disp: 9 tablet, Rfl: 0  Allergies   Allergen Reactions   • Doxycycline GI Intolerance     Social History     Socioeconomic History   • Marital status: /Civil Union     Spouse name: None   • Number of children: None   • Years of education: None   • Highest education level: None   Occupational History   • None   Tobacco Use   • Smoking status: Former     Types: Cigarettes     Quit date: 2011     Years since quittin 8   • Smokeless tobacco: Never   Vaping Use   • Vaping Use: Never used   Substance and Sexual Activity   • Alcohol use: Yes     Comment: Social drinker   • Drug use: No   • Sexual activity: Yes     Partners: Female   Other Topics Concern   • None   Social History Narrative    Caffeine use    Daily coffee consumption (1 cup/day)    Denied:  History of     Yazdanism affiliation:  None    Uses safety equipment - seatbelts     Social Determinants of Health     Financial Resource Strain: Not on file   Food Insecurity: Not on file   Transportation Needs: Not on file   Physical Activity: Not on file   Stress: Not on file   Social Connections: Not on file   Intimate Partner Violence: Not on file   Housing Stability: Not on file     Family History   Problem Relation Age of Onset   • Hypothyroidism Mother    • Thyroid disease Mother    • Other Maternal "Grandmother         Thyroid disorder   • Diabetes Maternal Grandmother    • Colon cancer Paternal Grandmother    • Other Paternal Grandmother         Thyroid disorder   • Cancer Paternal Grandmother    • Colon cancer Paternal Grandfather    • Other Paternal Grandfather         Thyroid disorder   • Other Maternal Aunt         Thyroid disorder   • Other Paternal Aunt         Thyroid disorder   • Other Maternal Uncle         Thyroid disorder   • Colon cancer Paternal Uncle    • Crohn's disease Paternal Uncle    • Other Paternal Uncle         Thyroid disorder   • Other Family         Benign polyps of the large intestine   • No Known Problems Father        Review of Systems   Constitutional: Negative for chills, diaphoresis, fatigue and fever  Respiratory: Negative for apnea, cough, chest tightness, shortness of breath and wheezing  Cardiovascular: Negative for chest pain, palpitations and leg swelling  Gastrointestinal: Negative for abdominal distention, abdominal pain, anal bleeding, constipation, diarrhea, nausea, rectal pain and vomiting  Genitourinary: Negative for difficulty urinating, dyspareunia, dysuria, frequency, hematuria, menstrual problem, pelvic pain, urgency, vaginal bleeding, vaginal discharge and vaginal pain  Musculoskeletal: Negative for arthralgias, back pain and myalgias  Skin: Negative for color change and rash  Neurological: Negative for dizziness, syncope, light-headedness, numbness and headaches  Hematological: Negative for adenopathy  Does not bruise/bleed easily  Psychiatric/Behavioral: Negative for dysphoric mood and sleep disturbance  The patient is not nervous/anxious          Objective   Physical Exam  OBGyn Exam     Objective      /70   Pulse 70   Ht 5' 5\" (1 651 m)   Wt 61 9 kg (136 lb 7 1 oz)   LMP 03/29/2023   BMI 22 71 kg/m²     General:   alert and oriented, in no acute distress   Neck: normal to inspection and palpation   Breast: normal appearance, no " masses or tenderness   Heart:    Lungs:    Abdomen: soft, non-tender, without masses or organomegaly   Vulva: normal   Vagina: Without erythema or lesions or discharge  Normal   Cervix: Without lesions or discharge or cervicitis    No Cervical motion tenderness   Uterus: normal size, anteverted, non-tender   Adnexa: no mass, fullness, tenderness   Rectum: negative    Psych:  Normal mood and affect   Skin:  Without obvious lesions   Eyes: symmetric, with normal movements and reactivity   Musculoskeletal:  Normal muscle tone and movements appreciated

## 2023-07-31 ENCOUNTER — TELEPHONE (OUTPATIENT)
Dept: PSYCHIATRY | Facility: CLINIC | Age: 38
End: 2023-07-31

## 2023-07-31 NOTE — TELEPHONE ENCOUNTER
[facility] called to inquire if medical record request was received. Confirmed fax.     [phone]

## 2023-08-01 ENCOUNTER — TELEPHONE (OUTPATIENT)
Dept: PSYCHIATRY | Facility: CLINIC | Age: 38
End: 2023-08-01

## 2023-08-01 NOTE — TELEPHONE ENCOUNTER
Medical record request was received from [facility] [email or web address] com for the time frame of 4/29/2021 to present. Will be placed in Dr. Yony Lundberg by the end of the day.

## 2023-09-05 ENCOUNTER — APPOINTMENT (OUTPATIENT)
Dept: LAB | Age: 38
End: 2023-09-05
Payer: COMMERCIAL

## 2023-09-05 DIAGNOSIS — M25.50 PAIN IN JOINT, MULTIPLE SITES: ICD-10-CM

## 2023-09-05 LAB
25(OH)D3 SERPL-MCNC: 34.9 NG/ML (ref 30–100)
ANA SER QL IA: NEGATIVE
B BURGDOR IGG+IGM SER QL IA: NEGATIVE
BASOPHILS # BLD AUTO: 0.05 THOUSANDS/ÂΜL (ref 0–0.1)
BASOPHILS NFR BLD AUTO: 1 % (ref 0–1)
EOSINOPHIL # BLD AUTO: 0.06 THOUSAND/ÂΜL (ref 0–0.61)
EOSINOPHIL NFR BLD AUTO: 1 % (ref 0–6)
ERYTHROCYTE [DISTWIDTH] IN BLOOD BY AUTOMATED COUNT: 13 % (ref 11.6–15.1)
ERYTHROCYTE [SEDIMENTATION RATE] IN BLOOD: 13 MM/HOUR (ref 0–19)
HCT VFR BLD AUTO: 42.2 % (ref 34.8–46.1)
HGB BLD-MCNC: 13.2 G/DL (ref 11.5–15.4)
IMM GRANULOCYTES # BLD AUTO: 0.01 THOUSAND/UL (ref 0–0.2)
IMM GRANULOCYTES NFR BLD AUTO: 0 % (ref 0–2)
LYMPHOCYTES # BLD AUTO: 1.33 THOUSANDS/ÂΜL (ref 0.6–4.47)
LYMPHOCYTES NFR BLD AUTO: 25 % (ref 14–44)
MCH RBC QN AUTO: 30.9 PG (ref 26.8–34.3)
MCHC RBC AUTO-ENTMCNC: 31.3 G/DL (ref 31.4–37.4)
MCV RBC AUTO: 99 FL (ref 82–98)
MONOCYTES # BLD AUTO: 0.4 THOUSAND/ÂΜL (ref 0.17–1.22)
MONOCYTES NFR BLD AUTO: 8 % (ref 4–12)
NEUTROPHILS # BLD AUTO: 3.44 THOUSANDS/ÂΜL (ref 1.85–7.62)
NEUTS SEG NFR BLD AUTO: 65 % (ref 43–75)
NRBC BLD AUTO-RTO: 0 /100 WBCS
PLATELET # BLD AUTO: 249 THOUSANDS/UL (ref 149–390)
PMV BLD AUTO: 10.5 FL (ref 8.9–12.7)
RBC # BLD AUTO: 4.27 MILLION/UL (ref 3.81–5.12)
RHEUMATOID FACT SER QL LA: NEGATIVE
URATE SERPL-MCNC: 4 MG/DL (ref 2–7.5)
WBC # BLD AUTO: 5.29 THOUSAND/UL (ref 4.31–10.16)

## 2023-09-05 PROCEDURE — 86430 RHEUMATOID FACTOR TEST QUAL: CPT

## 2023-09-05 PROCEDURE — 85652 RBC SED RATE AUTOMATED: CPT

## 2023-09-05 PROCEDURE — 86618 LYME DISEASE ANTIBODY: CPT

## 2023-09-05 PROCEDURE — 84550 ASSAY OF BLOOD/URIC ACID: CPT

## 2023-09-05 PROCEDURE — 36415 COLL VENOUS BLD VENIPUNCTURE: CPT

## 2023-09-05 PROCEDURE — 82306 VITAMIN D 25 HYDROXY: CPT

## 2023-09-05 PROCEDURE — 86038 ANTINUCLEAR ANTIBODIES: CPT

## 2023-09-05 PROCEDURE — 85025 COMPLETE CBC W/AUTO DIFF WBC: CPT

## 2023-09-19 ENCOUNTER — TELEPHONE (OUTPATIENT)
Dept: NEUROLOGY | Facility: CLINIC | Age: 38
End: 2023-09-19

## 2023-09-19 NOTE — TELEPHONE ENCOUNTER
1st attempt to reach patient from [facility]. [employer] messaging for finding a doctor. Patient called to schedule new patient appointment for muscle trembling of arms and legs with weakness while standing. No testing done. Triage intake sent.

## 2023-10-04 ENCOUNTER — TELEMEDICINE (OUTPATIENT)
Dept: PSYCHIATRY | Facility: CLINIC | Age: 38
End: 2023-10-04
Payer: COMMERCIAL

## 2023-10-04 DIAGNOSIS — F41.1 GAD (GENERALIZED ANXIETY DISORDER): ICD-10-CM

## 2023-10-04 DIAGNOSIS — F33.42 MDD (MAJOR DEPRESSIVE DISORDER), RECURRENT, IN FULL REMISSION (HCC): Primary | ICD-10-CM

## 2023-10-04 PROBLEM — M25.50 POLYARTHRALGIA: Status: ACTIVE | Noted: 2023-09-15

## 2023-10-04 PROCEDURE — 90833 PSYTX W PT W E/M 30 MIN: CPT | Performed by: PSYCHIATRY & NEUROLOGY

## 2023-10-04 PROCEDURE — 99213 OFFICE O/P EST LOW 20 MIN: CPT | Performed by: PSYCHIATRY & NEUROLOGY

## 2023-10-04 RX ORDER — CITALOPRAM 20 MG/1
20 TABLET ORAL DAILY
Qty: 90 TABLET | Refills: 1 | Status: SHIPPED | OUTPATIENT
Start: 2023-10-04

## 2023-10-04 RX ORDER — CLONAZEPAM 1 MG/1
1 TABLET ORAL
Qty: 90 TABLET | Refills: 1 | Status: SHIPPED | OUTPATIENT
Start: 2023-10-04

## 2023-10-04 NOTE — BH TREATMENT PLAN
TREATMENT PLAN (Medication Management Only)        [address] [facility]    Name and Date of Birth:  Ty Milligan 41 y.o. 1985  Date of Treatment Plan: October 4, 2023  Diagnosis/Diagnoses:    1. MDD (major depressive disorder), recurrent severe, without psychosis ([address])    2. SHARA (generalized anxiety disorder)      Strengths/Personal Resources for Self-Care: taking medications as prescribed, ability to communicate needs. Area/Areas of need (in own words): anxiety, anxiety symptoms, depression, depressive symptoms  1. Long Term Goal: continue improvement in depression. Target Date:6 months - 4/4/2024  Person/Persons responsible for completion of goal: Cherish  2. Short Term Objective (s) - How will we reach this goal?:   A. Provider new recommended medication/dosage changes and/or continue medication(s): continue current medications as prescribed. B. N/A.  C. N/A. Target Date:6 months - 4/4/2024  Person/Persons Responsible for Completion of Goal: Cherish  Progress Towards Goals: continuing treatment  Treatment Modality: medication management every 6 months  Review due 180 days from date of this plan: 6 months - 4/4/2024  Expected length of service: ongoing treatment  My Physician/PA/NP and I have developed this plan together and I agree to work on the goals and objectives. I understand the treatment goals that were developed for my treatment.

## 2023-10-04 NOTE — PSYCH
Virtual Regular Visit    Verification of patient location:    Patient is located in the following state in which I hold an active license [place]      Assessment/Plan:    Problem List Items Addressed This Visit        Other    SHARA (generalized anxiety disorder)    Relevant Medications    clonazePAM (KlonoPIN) 1 mg tablet    citalopram (CeleXA) 20 mg tablet    MDD (major depressive disorder), recurrent severe, without psychosis ([address]) - Primary    Relevant Medications    citalopram (CeleXA) 20 mg tablet                Reason for visit is   No chief complaint on file. Encounter provider Donita Guan MD    Provider located at [address] [place] [id number] [phone]      Recent Visits  Date Type Provider Dept   10/04/23 Pati Whitley MD [facility]   Showing recent visits within past 7 days and meeting all other requirements  Future Appointments  No visits were found meeting these conditions. Showing future appointments within next 150 days and meeting all other requirements       The patient was identified by name and date of birth. Anay Mensah was informed that this is a telemedicine visit and [facility] She agrees to proceed. .  My office door was closed. No one else was in the room. She acknowledged consent and understanding of privacy and security of the video platform. The patient has agreed to participate and understands they can discontinue the visit at any time. Patient is aware this is a billable service. Rupert Sykes is a 41 y.o. female with MDD and SHARA . Patient remains compliant with medications and denies side effects . She denies recent health changes or new medications. She is reporting stable mood.   She denies feeling anxious or depressed  She stated she had workup negative for rheumatoid arthritis. She stated she has OA and will be consulting neurologist to discuss tremors, and balance issues. She agrees to continue current treatment as prescribed. Will schedule follow up in 6 months.         HPI     Past Medical History:   Diagnosis Date   • Depression 2010   • Migraine 1994   • Urinary tract infection    • Varicella        Past Surgical History:   Procedure Laterality Date   • COLONOSCOPY     • ESOPHAGOGASTRODUODENOSCOPY  02/17/2012    Diagnostic; Dr. Lauryn Meyer, chronic gastritis   • LUMBAR EPIDURAL INJECTION  05/28/2021   • SKIN BIOPSY     • TOOTH EXTRACTION         Current Outpatient Medications   Medication Sig Dispense Refill   • citalopram (CeleXA) 20 mg tablet Take 1 tablet (20 mg total) by mouth daily 90 tablet 1   • clonazePAM (KlonoPIN) 1 mg tablet Take 1 tablet (1 mg total) by mouth daily at bedtime 90 tablet 1     No current facility-administered medications for this visit.         Allergies   Allergen Reactions   • Doxycycline GI Intolerance       Review of Systems     Mood Anxiety and Depression   Behavior Normal    Thought Content Disturbing Thoughts, Feelings   General Emotional Problems and Decreased Functioning   Personality Normal   Other Psych Symptoms Normal   Constitutional Negative   ENT Negative   Cardiovascular Negative   Respiratory Negative   Gastrointestinal Negative   Genitourinary Negative   Musculoskeletal Negative   Integumentary Negative   Neurological Negative   Endocrine Normal    Other Symptoms Normal        Laboratory Results:   Recent Labs (last 12 months):   Appointment on 09/05/2023   Component Date Value   • POWER 09/05/2023 Negative    • WBC 09/05/2023 5.29    • RBC 09/05/2023 4.27    • Hemoglobin 09/05/2023 13.2    • Hematocrit 09/05/2023 42.2    • MCV 09/05/2023 99 (H)    • MCH 09/05/2023 30.9    • MCHC 09/05/2023 31.3 (L)    • RDW 09/05/2023 13.0    • MPV 09/05/2023 10.5    • Platelets 29/50/1221 249    • nRBC 09/05/2023 0    • Neutrophils Relative 09/05/2023 65    • Immat GRANS % 09/05/2023 0    • Lymphocytes Relative 09/05/2023 25    • Monocytes Relative 09/05/2023 8    • Eosinophils Relative 09/05/2023 1    • Basophils Relative 09/05/2023 1    • Neutrophils Absolute 09/05/2023 3.44    • Immature Grans Absolute 09/05/2023 0.01    • Lymphocytes Absolute 09/05/2023 1.33    • Monocytes Absolute 09/05/2023 0.40    • Eosinophils Absolute 09/05/2023 0.06    • Basophils Absolute 09/05/2023 0.05    • Sed Rate 09/05/2023 13    • Uric Acid 09/05/2023 4.0    • Vit D, 25-Hydroxy 09/05/2023 34.9    • Rheumatoid Factor 09/05/2023 Negative    • Lyme Total Antibodies 09/05/2023 Negative    Annual Exam on 04/06/2023   Component Date Value   • Case Report 04/06/2023                      Value:Gynecologic Cytology Report                       Case: [id number]                                  Authorizing Provider:  Vanessa Harrell MD            Collected:           04/06/2023 0755              Ordering Location:     [facility]      Received:            04/06/2023 [address] [place] [address]                                                                    First Screen:          [facility]                                                                Rescreen:              Trent Hernández                                                               Specimen:    LIQUID-BASED PAP, SCREENING, Cervix                                                       • Primary Interpretation 04/06/2023 Negative for intraepithelial lesion or malignancy    • Specimen Adequacy 04/06/2023 Satisfactory for evaluation. Endocervical/transformation zone component present. • Additional Information 04/06/2023                      Value: This result contains rich text formatting which cannot be displayed here.    • LMP 04/06/2023 3/29/2023    • HPV Other HR 04/06/2023 Negative    • HPV16 04/06/2023 Negative    • HPV18 04/06/2023 Negative Appointment on 12/01/2022   Component Date Value   • Vitamin B-12 12/01/2022 298    • WBC 12/01/2022 4.44    • RBC 12/01/2022 4.22    • Hemoglobin 12/01/2022 12.9    • Hematocrit 12/01/2022 40.8    • MCV 12/01/2022 97    • MCH 12/01/2022 30.6    • MCHC 12/01/2022 31.6    • RDW 12/01/2022 12.7    • Platelets 88/45/3881 281    • MPV 12/01/2022 10.1    • Cholesterol 12/01/2022 280 (H)    • Triglycerides 12/01/2022 89    • HDL, Direct 12/01/2022 146    • LDL Calculated 12/01/2022 116 (H)    • Non-HDL-Chol (CHOL-HDL) 12/01/2022 134    • Sodium 12/01/2022 139    • Potassium 12/01/2022 5.0    • Chloride 12/01/2022 108    • CO2 12/01/2022 27    • ANION GAP 12/01/2022 4    • BUN 12/01/2022 9    • Creatinine 12/01/2022 0.74    • Glucose, Fasting 12/01/2022 86    • Calcium 12/01/2022 9.7    • AST 12/01/2022 19    • ALT 12/01/2022 26    • Alkaline Phosphatase 12/01/2022 50    • Total Protein 12/01/2022 7.9    • Albumin 12/01/2022 3.7    • Total Bilirubin 12/01/2022 0.82    • eGFR 12/01/2022 105    • TSH 3RD GENERATON 12/01/2022 1.490    • Free T4 12/01/2022 0.87    • T3, Total 12/01/2022 1.00    • Vit D, 25-Hydroxy 12/01/2022 17.7 (L)        Substance Abuse History:  Social History     Substance and Sexual Activity   Drug Use No       Family Psychiatric History:   Family History   Problem Relation Age of Onset   • Hypothyroidism Mother    • Thyroid disease Mother    • Other Maternal Grandmother         Thyroid disorder   • Diabetes Maternal Grandmother    • Colon cancer Paternal Grandmother    • Other Paternal Grandmother         Thyroid disorder   • Cancer Paternal Grandmother    • Colon cancer Paternal Grandfather    • Other Paternal Grandfather         Thyroid disorder   • Other Maternal Aunt         Thyroid disorder   • Other Paternal Aunt         Thyroid disorder   • Other Maternal Uncle         Thyroid disorder   • Colon cancer Paternal Uncle    • Crohn's disease Paternal Uncle    • Other Paternal Uncle         Thyroid disorder   • Other Family         Benign polyps of the large intestine   • No Known Problems Father        The following portions of the patient's history were reviewed and updated as appropriate: allergies, current medications, past family history, past medical history, past social history, past surgical history and problem list.    Social History     Socioeconomic History   • Marital status: /Civil Union     Spouse name: Not on file   • Number of children: Not on file   • Years of education: Not on file   • Highest education level: Not on file   Occupational History   • Not on file   Tobacco Use   • Smoking status: Former     Types: Cigarettes     Quit date: 2011     Years since quittin.3   • Smokeless tobacco: Never   Vaping Use   • Vaping Use: Never used   Substance and Sexual Activity   • Alcohol use: Yes     Comment: Social drinker   • Drug use: No   • Sexual activity: Yes     Partners: Female   Other Topics Concern   • Not on file   Social History Narrative    Caffeine use    Daily coffee consumption (1 cup/day)    Denied:  History of     Worship affiliation:  None    Uses safety equipment - seatbelts     Social Determinants of Health     Financial Resource Strain: Not on file   Food Insecurity: Not on file   Transportation Needs: Not on file   Physical Activity: Not on file   Stress: Not on file   Social Connections: Not on file   Intimate Partner Violence: Not on file   Housing Stability: Not on file     Social History     Social History Narrative    Caffeine use    Daily coffee consumption (1 cup/day)    Denied:  History of     Worship affiliation:  None    Uses safety equipment - seatbelts       Objective:       Mental status:  Appearance calm and cooperative , adequate hygiene and grooming and good eye contact    Mood dysphoric   Affect affect was constricted   Speech a normal rate and fluent   Thought Processes coherent/organized and normal thought processes Hallucinations no hallucinations present    Thought Content no delusions   Abnormal Thoughts no suicidal thoughts  and no homicidal thoughts    Orientation  oriented to person and place and time   Remote Memory short term memory intact and long term memory intact   Attention Span concentration intact   Intellect Appears to be of Average Intelligence   Insight Limited insight   Judgement judgment was limited   Muscle Strength Muscle strength and tone were normal and Normal gait    Language no difficulty naming common objects and no difficulty repeating a phrase    Fund of Knowledge displays adequate knowledge of current events, adequate fund of knowledge regarding past history and adequate fund of knowledge regarding vocabulary                Assessment/Plan:       Diagnoses and all orders for this visit:    MDD (major depressive disorder), recurrent, in full remission ([address])  -     citalopram (CeleXA) 20 mg tablet; Take 1 tablet (20 mg total) by mouth daily    SHARA (generalized anxiety disorder)  -     clonazePAM (KlonoPIN) 1 mg tablet; Take 1 tablet (1 mg total) by mouth daily at bedtime            Treatment Recommendations- Risks Benefits      Immediate Medical/Psychiatric/Psychotherapy Treatments and Any Precautions: continue current treatment     Risks, Benefits And Possible Side Effects Of Medications:  {PSYCH RISK, BENEFITS AND POSSIBLE SIDE EFFECTS (Optional):20493    Controlled Medication Discussion: Discussed with patient Black Box warning on concurrent use of benzodiazepines and opioid medications including sedation, respiratory depression, coma and death. Patient understands the risk of treatment with benzodiazepines in addition to opioids and wants to continue taking those medications. , Discussed with patient the risks of sedation, respiratory depression, impairment of ability to drive and potential for abuse and addiction related to treatment with benzodiazepine medications.  The patient understands risk of treatment with benzodiazepine medications, agrees to not drive if feels impaired and agrees to take medications as prescribed. and The patient has been filling controlled prescriptions on time as prescribed to [address] program.      Psychotherapy Provided: Individual psychotherapy provided. Individual psychotherapy provided: Yes  Counseling was provided during the session today for 16 minutes. Medications, treatment progress and treatment plan reviewed with Radha. Medication education provided to Radha. Goals discussed during in session: continue improvement in anxiety. Coping strategies including compliance with medications, deep/slow breathing, eliminating avoidance, engaging in previously avoided activities, exercising, getting into a good routine, increasing energy, increasing interest in usual activities, increasing motivation, increasing social interaction, keeping busy at work, maintain healthy diet, maintain heathy sleeping hygiene and maintain positive attitude reviewed with Radha. Importance of medication and treatment compliance reviewed with Radha. Educated on importance of medication and treatment compliance. Supportive therapy provided.                          Visit Time    Visit Start Time: 9:30  Visit Stop Time: 10:00  Total Visit Duration: 30 minutes

## 2023-10-06 ENCOUNTER — TELEPHONE (OUTPATIENT)
Dept: NEUROLOGY | Facility: CLINIC | Age: 38
End: 2023-10-06

## 2023-10-06 NOTE — TELEPHONE ENCOUNTER
Call to pt to reminde her of appt with us on 12?12 in the [facility] office. Pt states she will unfortunately have to cxl appt , states she wcb to r/s.

## 2023-11-09 ENCOUNTER — TELEPHONE (OUTPATIENT)
Dept: PSYCHIATRY | Facility: CLINIC | Age: 38
End: 2023-11-09

## 2023-11-09 DIAGNOSIS — F33.42 MDD (MAJOR DEPRESSIVE DISORDER), RECURRENT, IN FULL REMISSION (HCC): ICD-10-CM

## 2023-11-09 RX ORDER — CITALOPRAM HYDROBROMIDE 10 MG/1
10 TABLET ORAL DAILY
Qty: 30 TABLET | Refills: 0 | Status: SHIPPED | OUTPATIENT
Start: 2023-11-09

## 2023-11-09 NOTE — TELEPHONE ENCOUNTER
Radha Wang and/or Patient requested a call back to discuss as per conservation with her neurologist to taper down on rx. They can be reached at P# [phone]. Thank you.

## 2023-11-09 NOTE — TELEPHONE ENCOUNTER
Spoke with Radha. She reports she saw the Neurologist for hand tremors and hand shaking. He stated it could be medication related and it could not. He recommended she kelly off the Citalopram.     Radha is in agreement if WENDY Kaur thinks this is appropriate. She would be willing to go to 10 mg.        Please review

## 2023-12-11 ENCOUNTER — TELEPHONE (OUTPATIENT)
Dept: PSYCHIATRY | Facility: CLINIC | Age: 38
End: 2023-12-11

## 2023-12-11 DIAGNOSIS — F33.42 MDD (MAJOR DEPRESSIVE DISORDER), RECURRENT, IN FULL REMISSION (HCC): ICD-10-CM

## 2023-12-11 RX ORDER — CITALOPRAM 20 MG/1
20 TABLET ORAL DAILY
Qty: 30 TABLET | Refills: 2 | Status: SHIPPED | OUTPATIENT
Start: 2023-12-11

## 2023-12-11 NOTE — TELEPHONE ENCOUNTER
Patient called the office in regards to medication Celexa 10mg. She's stating that she needs to go back up to celexa 20mg. This writer informed patient that the message would be sent over to the provider.

## 2024-02-21 PROBLEM — Z11.59 ENCOUNTER FOR SCREENING FOR OTHER VIRAL DISEASES: Status: RESOLVED | Noted: 2020-08-24 | Resolved: 2024-02-21

## 2024-04-04 DIAGNOSIS — F41.1 GAD (GENERALIZED ANXIETY DISORDER): ICD-10-CM

## 2024-04-04 RX ORDER — CLONAZEPAM 1 MG/1
1 TABLET ORAL
Qty: 90 TABLET | Refills: 1 | Status: SHIPPED | OUTPATIENT
Start: 2024-04-04

## 2024-04-04 NOTE — TELEPHONE ENCOUNTER
Medication Refill Request     Name of Medication Clonazepam  Dose/Frequency 1 mg/ Take 1 tablet by mouth daily at bedtime.   Quantity 90  Verified pharmacy   [x]  Verified ordering Provider   [x]  Does patient have enough for the next 3 days? Yes [] No [x]  Does patient have a follow-up appointment scheduled? Yes [x] No []   If so when is appointment: 4/8/2024

## 2024-04-08 ENCOUNTER — TELEPHONE (OUTPATIENT)
Dept: PSYCHIATRY | Facility: CLINIC | Age: 39
End: 2024-04-08

## 2024-04-08 NOTE — TELEPHONE ENCOUNTER
Writer ADELA informing patient today's apt has been cancelled due to provider being out of office. Provided office number to call back to schedule and/or request refills. Please assist upon return call.TY

## 2024-04-11 ENCOUNTER — TELEPHONE (OUTPATIENT)
Dept: PSYCHIATRY | Facility: CLINIC | Age: 39
End: 2024-04-11

## 2024-04-11 ENCOUNTER — ANNUAL EXAM (OUTPATIENT)
Dept: GYNECOLOGY | Facility: CLINIC | Age: 39
End: 2024-04-11
Payer: COMMERCIAL

## 2024-04-11 VITALS
BODY MASS INDEX: 23.93 KG/M2 | WEIGHT: 140.2 LBS | HEIGHT: 64 IN | DIASTOLIC BLOOD PRESSURE: 76 MMHG | SYSTOLIC BLOOD PRESSURE: 130 MMHG

## 2024-04-11 DIAGNOSIS — N39.3 URINARY, INCONTINENCE, STRESS FEMALE: ICD-10-CM

## 2024-04-11 DIAGNOSIS — R10.2 PELVIC PRESSURE IN FEMALE: ICD-10-CM

## 2024-04-11 DIAGNOSIS — Z01.419 ENCOUNTER FOR ANNUAL ROUTINE GYNECOLOGICAL EXAMINATION: Primary | ICD-10-CM

## 2024-04-11 PROCEDURE — S0612 ANNUAL GYNECOLOGICAL EXAMINA: HCPCS | Performed by: OBSTETRICS & GYNECOLOGY

## 2024-04-11 NOTE — PROGRESS NOTES
Assessment/Plan:    pap is up to date      Discussed self breast exams    Left lower quadrant pressure-she is mildly tender on exam, no mass palpated.  She is due for her menstrual cycle in a few days.  I offered an ultrasound but she has had many cysts in the past and they have all spontaneously resolved.  If the pain is not completely resolved by the end of her menstrual cycle, she will call and we can order an ultrasound.    Spotting just before her menstrual cycle-this occurred for a few months but has not yet occurred this month.  She will carefully observe for this and if it continues to happen, she will call and we will schedule her for a sonohysterogram to rule out a polyp.    Stress incontinence-she is able to do a Kegel.  I reviewed this with her and recommended that she do them regularly as this should help with her mild stress incontinence.  She was given instructions for Kegels.    discussed preventive care, regular exercise and a healthy diet      No problem-specific Assessment & Plan notes found for this encounter.       Diagnoses and all orders for this visit:    Encounter for annual routine gynecological examination          Subjective:      Patient ID: Radha Wang is a 38 y.o. female.    Patient here for yearly.  Menses have been more regular.  A few times in the past few months, she had a day of spotting and then nothing for up to 5 days and then her menstrual cycle started.  It is heavy for 3 days and she changes her protection about every 2 hours and the remainder of the days are spotting.  When she has the extra day of spotting, she bleeds for total of 10 days.  This has not been happening frequently, only a few times..     For about a week, she has been having pressure on her left side.  She does have a history of ovarian cysts, they typically resolve spontaneously.      She will occasionally notice some stress incontinence.  She does not need contraception    Normal Pap and negative  "HPV in April 2023, declines STD testing        The following portions of the patient's history were reviewed and updated as appropriate: allergies, current medications, past family history, past medical history, past social history, past surgical history, and problem list.    Review of Systems   Constitutional: Negative.    Gastrointestinal: Negative.    Genitourinary:  Positive for menstrual problem.        Stress incontinence  Left lower quadrant pressure         Objective:      /76 (BP Location: Left arm, Patient Position: Sitting)   Ht 5' 3.75\" (1.619 m)   Wt 63.6 kg (140 lb 3.2 oz)   LMP 03/14/2024 (Exact Date)   BMI 24.25 kg/m²          Physical Exam  Vitals reviewed.   Constitutional:       Appearance: Normal appearance. She is well-developed.   Neck:      Thyroid: No thyromegaly.      Trachea: No tracheal deviation.   Cardiovascular:      Rate and Rhythm: Normal rate and regular rhythm.   Pulmonary:      Effort: Pulmonary effort is normal.      Breath sounds: Normal breath sounds.   Chest:   Breasts:     Breasts are symmetrical.      Right: No inverted nipple, mass, nipple discharge, skin change or tenderness.      Left: No inverted nipple, mass, nipple discharge, skin change or tenderness.   Abdominal:      General: There is no distension.      Palpations: Abdomen is soft. There is no mass.      Tenderness: There is no abdominal tenderness.   Genitourinary:     Labia:         Right: No rash, tenderness, lesion or injury.         Left: No rash, tenderness, lesion or injury.       Vagina: Normal.      Cervix: Normal. No cervical motion tenderness, discharge or friability.      Uterus: Normal.       Adnexa: Right adnexa normal.        Right: No mass, tenderness or fullness.          Left: Tenderness present. No mass or fullness.        Comments: Tender on left side, no mass palpated  Neurological:      Mental Status: She is alert.           "

## 2024-04-11 NOTE — TELEPHONE ENCOUNTER
Patient contacted the office to schedule a follow up visit with provider. Patient is now scheduled for 5/15/2024  at 10:30 am virtually.

## 2024-05-15 ENCOUNTER — TELEMEDICINE (OUTPATIENT)
Dept: PSYCHIATRY | Facility: CLINIC | Age: 39
End: 2024-05-15

## 2024-05-15 DIAGNOSIS — F33.2 MDD (MAJOR DEPRESSIVE DISORDER), RECURRENT SEVERE, WITHOUT PSYCHOSIS (HCC): Primary | ICD-10-CM

## 2024-05-15 DIAGNOSIS — F33.42 MDD (MAJOR DEPRESSIVE DISORDER), RECURRENT, IN FULL REMISSION (HCC): ICD-10-CM

## 2024-05-15 DIAGNOSIS — F41.1 GAD (GENERALIZED ANXIETY DISORDER): ICD-10-CM

## 2024-05-15 PROBLEM — R25.1 TREMOR: Status: ACTIVE | Noted: 2023-10-04

## 2024-05-15 RX ORDER — CITALOPRAM 20 MG/1
20 TABLET ORAL DAILY
Qty: 90 TABLET | Refills: 1 | Status: SHIPPED | OUTPATIENT
Start: 2024-05-15

## 2024-05-15 RX ORDER — CLONAZEPAM 1 MG/1
1 TABLET ORAL
Qty: 90 TABLET | Refills: 1 | Status: SHIPPED | OUTPATIENT
Start: 2024-05-15

## 2024-05-15 NOTE — PSYCH
Virtual Regular Visit    Verification of patient location:    Patient is located in the following state in which I hold an active license [place]      Assessment/Plan:    Problem List Items Addressed This Visit          Behavioral Health    SHARA (generalized anxiety disorder)    Relevant Medications    citalopram (CeleXA) 20 mg tablet    clonazePAM (KlonoPIN) 1 mg tablet    MDD (major depressive disorder), recurrent severe, without psychosis (HCC) - Primary    Relevant Medications    citalopram (CeleXA) 20 mg tablet     Other Visit Diagnoses       MDD (major depressive disorder), recurrent, in full remission (HCC)        Relevant Medications    citalopram (CeleXA) 20 mg tablet                       Reason for visit is   No chief complaint on file.       Encounter provider Mariana Rangel MD    Provider located at [address] [place] [address] [phone]      Recent Visits  No visits were found meeting these conditions.  Showing recent visits within past 7 days and meeting all other requirements  Today's Visits  Date Type Provider Dept   05/15/24 [facility]   Showing today's visits and meeting all other requirements  Future Appointments  No visits were found meeting these conditions.  Showing future appointments within next 150 days and meeting all other requirements       The patient was identified by name and date of birth. Radha Wang was informed that this is a telemedicine visit and that the visit is being conducted throughthe Epic Embedded platform. She agrees to proceed..  My office door was closed. No one else was in the room.  She acknowledged consent and understanding of privacy and security of the video platform. The patient has agreed to participate and understands they can discontinue the visit at any time.    Patient is aware this is a  [facility] service.     Subjective  Radha Wang is a 38 y.o. female with MDD and SHARA .Patient remains compliant with medications and denies side effects . She denies recent health changes or new medications. She is reporting stable mood.  She denies feeling anxious or depressed  She stated she had workup negative for rheumatoid arthritis. She stated she has OA and will be consulting neurologist to discuss tremors, and balance issues.     Since last seen she stated she tried reducing the Citalopram to 10 mg but she noticed she was feeling irritable and agitated and she decided to resume 20 mg. She stated that the neurologist suggested stopping Citalopram since they couldn't determine a cause for her tremors, but that approach did not helped.  She stated at this point she prefers to deal with the tremors. She is seeking second opinion as well.        She agrees to continue current treatment as prescribed. Will schedule follow up in 6 months.         HPI     Past Medical History:   Diagnosis Date    Depression 2010    Migraine 1994    Urinary tract infection     Varicella        Past Surgical History:   Procedure Laterality Date    COLONOSCOPY      ESOPHAGOGASTRODUODENOSCOPY  02/17/2012    Diagnostic; Dr. Martínez, chronic gastritis    LUMBAR EPIDURAL INJECTION  05/28/2021    SKIN BIOPSY      TOOTH EXTRACTION         Current Outpatient Medications   Medication Sig Dispense Refill    citalopram (CeleXA) 20 mg tablet Take 1 tablet (20 mg total) by mouth daily 90 tablet 1    clonazePAM (KlonoPIN) 1 mg tablet Take 1 tablet (1 mg total) by mouth daily at bedtime 90 tablet 1     No current facility-administered medications for this visit.        Allergies   Allergen Reactions    Doxycycline GI Intolerance       Review of Systems     Mood Anxiety and Depression   Behavior Normal    Thought Content Disturbing Thoughts, Feelings   General Emotional Problems and Decreased Functioning   Personality Normal   Other Psych Symptoms  Normal   Constitutional Negative   ENT Negative   Cardiovascular Negative   Respiratory Negative   Gastrointestinal Negative   Genitourinary Negative   Musculoskeletal Negative   Integumentary Negative   Neurological Negative   Endocrine Normal    Other Symptoms Normal        Laboratory Results:   Recent Labs (last 12 months):   Appointment on 09/05/2023   Component Date Value    POWER 09/05/2023 Negative     WBC 09/05/2023 5.29     RBC 09/05/2023 4.27     Hemoglobin 09/05/2023 13.2     Hematocrit 09/05/2023 42.2     MCV 09/05/2023 99 (H)     MCH 09/05/2023 30.9     MCHC 09/05/2023 31.3 (L)     RDW 09/05/2023 13.0     MPV 09/05/2023 10.5     Platelets 09/05/2023 249     nRBC 09/05/2023 0     Segmented % 09/05/2023 65     Immature Grans % 09/05/2023 0     Lymphocytes % 09/05/2023 25     Monocytes % 09/05/2023 8     Eosinophils Relative 09/05/2023 1     Basophils Relative 09/05/2023 1     Absolute Neutrophils 09/05/2023 3.44     Absolute Immature Grans 09/05/2023 0.01     Absolute Lymphocytes 09/05/2023 1.33     Absolute Monocytes 09/05/2023 0.40     Eosinophils Absolute 09/05/2023 0.06     Basophils Absolute 09/05/2023 0.05     Sed Rate 09/05/2023 13     Uric Acid 09/05/2023 4.0     Vit D, 25-Hydroxy 09/05/2023 34.9     Rheumatoid Factor 09/05/2023 Negative     Lyme Total Antibodies 09/05/2023 Negative        Substance Abuse History:  Social History     Substance and Sexual Activity   Drug Use No       Family Psychiatric History:   Family History   Problem Relation Age of Onset    Hypothyroidism Mother     Thyroid disease Mother     Other Maternal Grandmother         Thyroid disorder    Diabetes Maternal Grandmother     Colon cancer Paternal Grandmother     Other Paternal Grandmother         Thyroid disorder    Cancer Paternal Grandmother     Colon cancer Paternal Grandfather     Other Paternal Grandfather         Thyroid disorder    Other Maternal Aunt         Thyroid disorder    Other Paternal Aunt         Thyroid  disorder    Other Maternal Uncle         Thyroid disorder    Colon cancer Paternal Uncle     Crohn's disease Paternal Uncle     Other Paternal Uncle         Thyroid disorder    Other Family         Benign polyps of the large intestine    No Known Problems Father        The following portions of the patient's history were reviewed and updated as appropriate: allergies, current medications, past family history, past medical history, past social history, past surgical history and problem list.    Social History     Socioeconomic History    Marital status: /Civil Union     Spouse name: Not on file    Number of children: Not on file    Years of education: Not on file    Highest education level: Not on file   Occupational History    Not on file   Tobacco Use    Smoking status: Former     Current packs/day: 0.00     Types: Cigarettes     Quit date: 2011     Years since quittin.9    Smokeless tobacco: Never   Vaping Use    Vaping status: Never Used   Substance and Sexual Activity    Alcohol use: Yes     Comment: Social drinker    Drug use: No    Sexual activity: Yes     Partners: Female   Other Topics Concern    Not on file   Social History Narrative    Caffeine use    Daily coffee consumption (1 cup/day)    Denied:  History of     Faith affiliation:  None    Uses safety equipment - seatbelts     Social Determinants of Health     Financial Resource Strain: Not on file   Food Insecurity: Not on file   Transportation Needs: Not on file   Physical Activity: Not on file   Stress: Not on file   Social Connections: Not on file   Intimate Partner Violence: Not on file   Housing Stability: Not on file     Social History     Social History Narrative    Caffeine use    Daily coffee consumption (1 cup/day)    Denied:  History of     Faith affiliation:  None    Uses safety equipment - seatbelts       Objective:       Mental status:  Appearance calm and cooperative , adequate hygiene and grooming  and good eye contact    Mood dysphoric   Affect affect was constricted   Speech a normal rate and fluent   Thought Processes coherent/organized and normal thought processes   Hallucinations no hallucinations present    Thought Content no delusions   Abnormal Thoughts no suicidal thoughts  and no homicidal thoughts    Orientation  oriented to person and place and time   Remote Memory short term memory intact and long term memory intact   Attention Span concentration intact   Intellect Appears to be of Average Intelligence   Insight Limited insight   Judgement judgment was limited   Muscle Strength Muscle strength and tone were normal and Normal gait    Language no difficulty naming common objects and no difficulty repeating a phrase    Fund of Knowledge displays adequate knowledge of current events, adequate fund of knowledge regarding past history and adequate fund of knowledge regarding vocabulary                Assessment/Plan:       Diagnoses and all orders for this visit:    MDD (major depressive disorder), recurrent severe, without psychosis (HCC)    SHARA (generalized anxiety disorder)  -     clonazePAM (KlonoPIN) 1 mg tablet; Take 1 tablet (1 mg total) by mouth daily at bedtime    MDD (major depressive disorder), recurrent, in full remission (HCC)  -     citalopram (CeleXA) 20 mg tablet; Take 1 tablet (20 mg total) by mouth daily              Treatment Recommendations- Risks Benefits      Immediate Medical/Psychiatric/Psychotherapy Treatments and Any Precautions: continue current treatment     Risks, Benefits And Possible Side Effects Of Medications:  {PSYCH RISK, BENEFITS AND POSSIBLE SIDE EFFECTS (Optional):47552    Controlled Medication Discussion: Discussed with patient Black Box warning on concurrent use of benzodiazepines and opioid medications including sedation, respiratory depression, coma and death. Patient understands the risk of treatment with benzodiazepines in addition to opioids and wants to  continue taking those medications. , Discussed with patient the risks of sedation, respiratory depression, impairment of ability to drive and potential for abuse and addiction related to treatment with benzodiazepine medications. The patient understands risk of treatment with benzodiazepine medications, agrees to not drive if feels impaired and agrees to take medications as prescribed. and The patient has been filling controlled prescriptions on time as prescribed to [place] Prescription Drug Monitoring program.      Psychotherapy Provided: Individual psychotherapy provided.   Individual psychotherapy provided: Yes  Counseling was provided during the session today for 16 minutes.  Medications, treatment progress and treatment plan reviewed with Radha.  Medication education provided to Radha.  Goals discussed during in session: continue improvement in anxiety.   Coping strategies including compliance with medications, deep/slow breathing, eliminating avoidance, engaging in previously avoided activities, exercising, getting into a good routine, increasing energy, increasing interest in usual activities, increasing motivation, increasing social interaction, keeping busy at work, maintain healthy diet, maintain heathy sleeping hygiene and maintain positive attitude reviewed with Radha.   Importance of medication and treatment compliance reviewed with Radha.  Educated on importance of medication and treatment compliance.  Supportive therapy provided.                         Visit Time    Visit Start Time: 10:30  Visit Stop Time: 11:00  Total Visit Duration:  30 minutes

## 2024-05-15 NOTE — BH TREATMENT PLAN
TREATMENT PLAN (Medication Management Only)        [facility]    Name and Date of Birth:  Radha Wang 38 y.o. 1985  Date of Treatment Plan: May 15, 2024  Diagnosis/Diagnoses:    1. MDD (major depressive disorder), recurrent severe, without psychosis (HCC)    2. SHARA (generalized anxiety disorder)      Strengths/Personal Resources for Self-Care: supportive family, taking medications as prescribed, ability to communicate needs.  Area/Areas of need (in own words): anxiety, anxiety symptoms, depression, depressive symptoms  1. Long Term Goal: continue improvement in depression.  Target Date:6 months - 11/15/2024  Person/Persons responsible for completion of goal: Cherish  2.  Short Term Objective (s) - How will we reach this goal?:   A. Provider new recommended medication/dosage changes and/or continue medication(s): continue current medications as prescribed.  B. N/A.  C. N/A.  Target Date:6 months - 11/15/2024  Person/Persons Responsible for Completion of Goal: Cherish  Progress Towards Goals: continuing treatment  Treatment Modality: medication management every 6 months  Review due 180 days from date of this plan: 6 months - 11/15/2024  Expected length of service: ongoing treatment  My Physician/PA/NP and I have developed this plan together and I agree to work on the goals and objectives. I understand the treatment goals that were developed for my treatment.

## 2024-05-16 ENCOUNTER — TELEPHONE (OUTPATIENT)
Dept: PSYCHIATRY | Facility: CLINIC | Age: 39
End: 2024-05-16

## 2024-06-28 ENCOUNTER — APPOINTMENT (OUTPATIENT)
Dept: LAB | Age: 39
End: 2024-06-28
Payer: COMMERCIAL

## 2024-06-28 DIAGNOSIS — Z00.00 ENCOUNTER FOR GENERAL ADULT MEDICAL EXAMINATION WITHOUT ABNORMAL FINDINGS: ICD-10-CM

## 2024-06-28 LAB
25(OH)D3 SERPL-MCNC: 26.2 NG/ML (ref 30–100)
ALBUMIN SERPL BCG-MCNC: 4.3 G/DL (ref 3.5–5)
ALP SERPL-CCNC: 45 U/L (ref 34–104)
ALT SERPL W P-5'-P-CCNC: 20 U/L (ref 7–52)
ANION GAP SERPL CALCULATED.3IONS-SCNC: 11 MMOL/L (ref 4–13)
AST SERPL W P-5'-P-CCNC: 24 U/L (ref 13–39)
BACTERIA UR QL AUTO: ABNORMAL /HPF
BILIRUB SERPL-MCNC: 0.49 MG/DL (ref 0.2–1)
BILIRUB UR QL STRIP: NEGATIVE
BUN SERPL-MCNC: 12 MG/DL (ref 5–25)
CALCIUM SERPL-MCNC: 9.5 MG/DL (ref 8.4–10.2)
CHLORIDE SERPL-SCNC: 103 MMOL/L (ref 96–108)
CHOLEST SERPL-MCNC: 239 MG/DL
CLARITY UR: CLEAR
CO2 SERPL-SCNC: 26 MMOL/L (ref 21–32)
COLOR UR: ABNORMAL
CREAT SERPL-MCNC: 0.64 MG/DL (ref 0.6–1.3)
ERYTHROCYTE [DISTWIDTH] IN BLOOD BY AUTOMATED COUNT: 12.6 % (ref 11.6–15.1)
EST. AVERAGE GLUCOSE BLD GHB EST-MCNC: 88 MG/DL
FERRITIN SERPL-MCNC: 18 NG/ML (ref 11–307)
GFR SERPL CREATININE-BSD FRML MDRD: 115 ML/MIN/1.73SQ M
GLUCOSE P FAST SERPL-MCNC: 77 MG/DL (ref 65–99)
GLUCOSE UR STRIP-MCNC: NEGATIVE MG/DL
HBA1C MFR BLD: 4.7 %
HCT VFR BLD AUTO: 38.5 % (ref 34.8–46.1)
HDLC SERPL-MCNC: 141 MG/DL
HGB BLD-MCNC: 12.2 G/DL (ref 11.5–15.4)
HGB UR QL STRIP.AUTO: ABNORMAL
KETONES UR STRIP-MCNC: NEGATIVE MG/DL
LDLC SERPL CALC-MCNC: 80 MG/DL (ref 0–100)
LEUKOCYTE ESTERASE UR QL STRIP: NEGATIVE
MCH RBC QN AUTO: 30.7 PG (ref 26.8–34.3)
MCHC RBC AUTO-ENTMCNC: 31.7 G/DL (ref 31.4–37.4)
MCV RBC AUTO: 97 FL (ref 82–98)
MUCOUS THREADS UR QL AUTO: ABNORMAL
NITRITE UR QL STRIP: NEGATIVE
NON-SQ EPI CELLS URNS QL MICRO: ABNORMAL /HPF
NONHDLC SERPL-MCNC: 98 MG/DL
PH UR STRIP.AUTO: 6 [PH]
PLATELET # BLD AUTO: 214 THOUSANDS/UL (ref 149–390)
PMV BLD AUTO: 10.4 FL (ref 8.9–12.7)
POTASSIUM SERPL-SCNC: 4.7 MMOL/L (ref 3.5–5.3)
PROT SERPL-MCNC: 6.8 G/DL (ref 6.4–8.4)
PROT UR STRIP-MCNC: ABNORMAL MG/DL
RBC # BLD AUTO: 3.98 MILLION/UL (ref 3.81–5.12)
RBC #/AREA URNS AUTO: ABNORMAL /HPF
SODIUM SERPL-SCNC: 140 MMOL/L (ref 135–147)
SP GR UR STRIP.AUTO: 1.02 (ref 1–1.03)
TRIGL SERPL-MCNC: 91 MG/DL
TSH SERPL DL<=0.05 MIU/L-ACNC: 1.61 UIU/ML (ref 0.45–4.5)
UROBILINOGEN UR STRIP-ACNC: <2 MG/DL
VIT B12 SERPL-MCNC: 200 PG/ML (ref 180–914)
WBC # BLD AUTO: 4.65 THOUSAND/UL (ref 4.31–10.16)
WBC #/AREA URNS AUTO: ABNORMAL /HPF

## 2024-06-28 PROCEDURE — 36415 COLL VENOUS BLD VENIPUNCTURE: CPT

## 2024-06-28 PROCEDURE — 81001 URINALYSIS AUTO W/SCOPE: CPT

## 2024-06-28 PROCEDURE — 82306 VITAMIN D 25 HYDROXY: CPT

## 2024-06-28 PROCEDURE — 80053 COMPREHEN METABOLIC PANEL: CPT

## 2024-06-28 PROCEDURE — 82728 ASSAY OF FERRITIN: CPT

## 2024-06-28 PROCEDURE — 84443 ASSAY THYROID STIM HORMONE: CPT

## 2024-06-28 PROCEDURE — 83036 HEMOGLOBIN GLYCOSYLATED A1C: CPT

## 2024-06-28 PROCEDURE — 85027 COMPLETE CBC AUTOMATED: CPT

## 2024-06-28 PROCEDURE — 82607 VITAMIN B-12: CPT

## 2024-06-28 PROCEDURE — 80061 LIPID PANEL: CPT

## 2024-11-13 ENCOUNTER — TELEMEDICINE (OUTPATIENT)
Dept: PSYCHIATRY | Facility: CLINIC | Age: 39
End: 2024-11-13
Payer: COMMERCIAL

## 2024-11-13 DIAGNOSIS — F41.1 GAD (GENERALIZED ANXIETY DISORDER): ICD-10-CM

## 2024-11-13 DIAGNOSIS — F33.42 MDD (MAJOR DEPRESSIVE DISORDER), RECURRENT, IN FULL REMISSION (HCC): ICD-10-CM

## 2024-11-13 DIAGNOSIS — F33.2 MDD (MAJOR DEPRESSIVE DISORDER), RECURRENT SEVERE, WITHOUT PSYCHOSIS (HCC): Primary | ICD-10-CM

## 2024-11-13 PROCEDURE — 99214 OFFICE O/P EST MOD 30 MIN: CPT | Performed by: PSYCHIATRY & NEUROLOGY

## 2024-11-13 RX ORDER — CITALOPRAM HYDROBROMIDE 20 MG/1
20 TABLET ORAL DAILY
Qty: 90 TABLET | Refills: 1 | Status: SHIPPED | OUTPATIENT
Start: 2024-11-13

## 2024-11-13 RX ORDER — CLONAZEPAM 1 MG/1
1 TABLET ORAL
Qty: 90 TABLET | Refills: 1 | Status: SHIPPED | OUTPATIENT
Start: 2024-11-13

## 2024-11-13 NOTE — BH TREATMENT PLAN
TREATMENT PLAN (Medication Management Only)        [facility]    Name and Date of Birth:  Radha Wang 39 y.o. 1985  Date of Treatment Plan: November 13, 2024  Diagnosis/Diagnoses:    1. MDD (major depressive disorder), recurrent severe, without psychosis (HCC)    2. SHARA (generalized anxiety disorder)      Strengths/Personal Resources for Self-Care: taking medications as prescribed, ability to communicate needs.  Area/Areas of need (in own words): anxiety, anxiety symptoms, depression, depressive symptoms  1. Long Term Goal: continue improvement in depression.  Target Date:6 months - 5/13/2025  Person/Persons responsible for completion of goal: Cherish  2.  Short Term Objective (s) - How will we reach this goal?:   A. Provider new recommended medication/dosage changes and/or continue medication(s): continue current medications as prescribed.  B. N/A.  C. N/A.  Target Date:6 months - 5/13/2025  Person/Persons Responsible for Completion of Goal: Cherish  Progress Towards Goals: continuing treatment  Treatment Modality: medication management every 6 months  Review due 180 days from date of this plan: 6 months - 5/13/2025  Expected length of service: ongoing treatment  My Physician/PA/NP and I have developed this plan together and I agree to work on the goals and objectives. I understand the treatment goals that were developed for my treatment.

## 2024-11-13 NOTE — PSYCH
Virtual Regular Visit    Verification of patient location:    Patient is located in the following state in which I hold an active license [place]      Assessment/Plan:    Problem List Items Addressed This Visit          Behavioral Health    SHARA (generalized anxiety disorder)                  MDD (major depressive disorder), recurrent severe, without psychosis (HCC) - Primary                                  Reason for visit is   No chief complaint on file.       Encounter provider Mariana Rangel MD    Provider located at [address] [place] [address] [phone]      Recent Visits  No visits were found meeting these conditions.  Showing recent visits within past 7 days and meeting all other requirements  Today's Visits  Date Type Provider Dept   11/13/24 Telemedicine Mariana Rangel MD  [facility]   Showing today's visits and meeting all other requirements  Future Appointments  No visits were found meeting these conditions.  Showing future appointments within next 150 days and meeting all other requirements       The patient was identified by name and date of birth. Radha Wang was informed that this is a telemedicine visit and that the visit is being conducted throughthe Epic Embedded platform. She agrees to proceed..  My office door was closed. No one else was in the room.  She acknowledged consent and understanding of privacy and security of the video platform. The patient has agreed to participate and understands they can discontinue the visit at any time.    Patient is aware this is a billable service.     Subjective  Radha Wang is a 39 y.o. female with MDD and SHARA .Patient remains compliant with medications and denies side effects . She denies recent health changes or new medications. She is reporting stable mood.  She denies feeling anxious or depressed  She stated she  had workup negative for rheumatoid arthritis. She stated she has OA and will be consulting neurologist to discuss tremors, and balance issues.     Since last seen she stated she tried reducing the Citalopram to 10 mg but she noticed she was feeling irritable and agitated and she decided to resume 20 mg. She stated that the neurologist suggested stopping Citalopram since they couldn't determine a cause for her tremors, but that approach did not helped.    Since last seen she stated her tremors seemed to have improved but it is still present.   Fasting labs from June 2024.  Elevated cholesterol and low Vit D.  Will repeat labs and has order from PCP.  Reported increased anxiety and recent episode of panic, Clonazepam helps manage symptoms.    She agrees to continue current treatment as prescribed. Will schedule follow up in 6 months.         HPI     Past Medical History:   Diagnosis Date    Depression 2010    Migraine 1994    Urinary tract infection     Varicella        Past Surgical History:   Procedure Laterality Date    COLONOSCOPY      ESOPHAGOGASTRODUODENOSCOPY  02/17/2012    Diagnostic; Dr. Martínez, chronic gastritis    LUMBAR EPIDURAL INJECTION  05/28/2021    SKIN BIOPSY      TOOTH EXTRACTION         Current Outpatient Medications   Medication Sig Dispense Refill    citalopram (CeleXA) 20 mg tablet Take 1 tablet (20 mg total) by mouth daily 90 tablet 1    clonazePAM (KlonoPIN) 1 mg tablet Take 1 tablet (1 mg total) by mouth daily at bedtime 90 tablet 1     No current facility-administered medications for this visit.        Allergies   Allergen Reactions    Doxycycline GI Intolerance       Review of Systems     Mood Anxiety and Depression   Behavior Normal    Thought Content Disturbing Thoughts, Feelings   General Emotional Problems and Decreased Functioning   Personality Normal   Other Psych Symptoms Normal   Constitutional Negative   ENT Negative   Cardiovascular Negative   Respiratory Negative    Gastrointestinal Negative   Genitourinary Negative   Musculoskeletal Negative   Integumentary Negative   Neurological Negative   Endocrine Normal    Other Symptoms Normal        Laboratory Results:   Recent Labs (last 12 months):   Appointment on 06/28/2024   Component Date Value    Ferritin 06/28/2024 18     Sodium 06/28/2024 140     Potassium 06/28/2024 4.7     Chloride 06/28/2024 103     CO2 06/28/2024 26     ANION GAP 06/28/2024 11     BUN 06/28/2024 12     Creatinine 06/28/2024 0.64     Glucose, Fasting 06/28/2024 77     Calcium 06/28/2024 9.5     AST 06/28/2024 24     ALT 06/28/2024 20     Alkaline Phosphatase 06/28/2024 45     Total Protein 06/28/2024 6.8     Albumin 06/28/2024 4.3     Total Bilirubin 06/28/2024 0.49     eGFR 06/28/2024 115     WBC 06/28/2024 4.65     RBC 06/28/2024 3.98     Hemoglobin 06/28/2024 12.2     Hematocrit 06/28/2024 38.5     MCV 06/28/2024 97     MCH 06/28/2024 30.7     MCHC 06/28/2024 31.7     RDW 06/28/2024 12.6     Platelets 06/28/2024 214     MPV 06/28/2024 10.4     TSH 3RD GENERATON 06/28/2024 1.606     Hemoglobin A1C 06/28/2024 4.7     EAG 06/28/2024 88     Cholesterol 06/28/2024 239 (H)     Triglycerides 06/28/2024 91     HDL, Direct 06/28/2024 141     LDL Calculated 06/28/2024 80     Non-HDL-Chol (CHOL-HDL) 06/28/2024 98     Color, UA 06/28/2024 Light Yellow     Clarity, UA 06/28/2024 Clear     Specific Gravity, UA 06/28/2024 1.019     pH, UA 06/28/2024 6.0     Leukocytes, UA 06/28/2024 Negative     Nitrite, UA 06/28/2024 Negative     Protein, UA 06/28/2024 Trace (A)     Glucose, UA 06/28/2024 Negative     Ketones, UA 06/28/2024 Negative     Urobilinogen, UA 06/28/2024 <2.0     Bilirubin, UA 06/28/2024 Negative     Occult Blood, UA 06/28/2024 Trace (A)     RBC, UA 06/28/2024 1-2     WBC, UA 06/28/2024 2-4 (A)     Epithelial Cells 06/28/2024 Occasional     Bacteria, UA 06/28/2024 Occasional     MUCUS THREADS 06/28/2024 Occasional (A)     Vitamin B-12 06/28/2024 200     Vit  D, 25-Hydroxy 2024 26.2 (L)        Substance Abuse History:  Social History     Substance and Sexual Activity   Drug Use No       Family Psychiatric History:   Family History   Problem Relation Age of Onset    Hypothyroidism Mother     Thyroid disease Mother     Other Maternal Grandmother         Thyroid disorder    Diabetes Maternal Grandmother     Colon cancer Paternal Grandmother     Other Paternal Grandmother         Thyroid disorder    Cancer Paternal Grandmother     Colon cancer Paternal Grandfather     Other Paternal Grandfather         Thyroid disorder    Other Maternal Aunt         Thyroid disorder    Other Paternal Aunt         Thyroid disorder    Other Maternal Uncle         Thyroid disorder    Colon cancer Paternal Uncle     Crohn's disease Paternal Uncle     Other Paternal Uncle         Thyroid disorder    Other Family         Benign polyps of the large intestine    No Known Problems Father        The following portions of the patient's history were reviewed and updated as appropriate: allergies, current medications, past family history, past medical history, past social history, past surgical history and problem list.    Social History     Socioeconomic History    Marital status: /Civil Union     Spouse name: Not on file    Number of children: Not on file    Years of education: Not on file    Highest education level: Not on file   Occupational History    Not on file   Tobacco Use    Smoking status: Former     Current packs/day: 0.00     Types: Cigarettes     Quit date: 2011     Years since quittin.4    Smokeless tobacco: Never   Vaping Use    Vaping status: Never Used   Substance and Sexual Activity    Alcohol use: Yes     Comment: Social drinker    Drug use: No    Sexual activity: Yes     Partners: Female   Other Topics Concern    Not on file   Social History Narrative    Caffeine use    Daily coffee consumption (1 cup/day)    Denied:  History of     Latter day affiliation:   None    Uses safety equipment - seatbelts     Social Drivers of Health     Financial Resource Strain: Not on file   Food Insecurity: Not on file   Transportation Needs: Not on file   Physical Activity: Not on file   Stress: Not on file   Social Connections: Not on file   Intimate Partner Violence: Not on file   Housing Stability: Not on file     Social History     Social History Narrative    Caffeine use    Daily coffee consumption (1 cup/day)    Denied:  History of     Latter day affiliation:  None    Uses safety equipment - seatbelts       Objective:       Mental status:  Appearance calm and cooperative , adequate hygiene and grooming and good eye contact    Mood dysphoric   Affect affect was constricted   Speech a normal rate and fluent   Thought Processes coherent/organized and normal thought processes   Hallucinations no hallucinations present    Thought Content no delusions   Abnormal Thoughts no suicidal thoughts  and no homicidal thoughts    Orientation  oriented to person and place and time   Remote Memory short term memory intact and long term memory intact   Attention Span concentration intact   Intellect Appears to be of Average Intelligence   Insight Limited insight   Judgement judgment was limited   Muscle Strength Muscle strength and tone were normal and Normal gait    Language no difficulty naming common objects and no difficulty repeating a phrase    Fund of Knowledge displays adequate knowledge of current events, adequate fund of knowledge regarding past history and adequate fund of knowledge regarding vocabulary                Assessment/Plan:       Diagnoses and all orders for this visit:    MDD (major depressive disorder), recurrent severe, without psychosis (HCC)    SHARA (generalized anxiety disorder)            Assessment & Plan  MDD (major depressive disorder), recurrent severe, without psychosis (HCC)         SHARA (generalized anxiety disorder)    Orders:    clonazePAM (KlonoPIN) 1 mg  tablet; Take 1 tablet (1 mg total) by mouth daily at bedtime           Treatment Recommendations- Risks Benefits      Immediate Medical/Psychiatric/Psychotherapy Treatments and Any Precautions: continue current treatment     Risks, Benefits And Possible Side Effects Of Medications:  {PSYCH RISK, BENEFITS AND POSSIBLE SIDE EFFECTS (Optional):81352    Controlled Medication Discussion: Discussed with patient Black Box warning on concurrent use of benzodiazepines and opioid medications including sedation, respiratory depression, coma and death. Patient understands the risk of treatment with benzodiazepines in addition to opioids and wants to continue taking those medications. , Discussed with patient the risks of sedation, respiratory depression, impairment of ability to drive and potential for abuse and addiction related to treatment with benzodiazepine medications. The patient understands risk of treatment with benzodiazepine medications, agrees to not drive if feels impaired and agrees to take medications as prescribed. and The patient has been filling controlled prescriptions on time as prescribed to [place] Prescription Drug Monitoring program.      Psychotherapy Provided: No                      Visit Time    Visit Start Time: 10:30  Visit Stop Time: 10:50  Total Visit Duration:  20 minutes

## 2024-11-13 NOTE — ASSESSMENT & PLAN NOTE
Orders:    clonazePAM (KlonoPIN) 1 mg tablet; Take 1 tablet (1 mg total) by mouth daily at bedtime

## 2024-11-14 ENCOUNTER — TELEPHONE (OUTPATIENT)
Dept: PSYCHIATRY | Facility: CLINIC | Age: 39
End: 2024-11-14

## 2024-11-14 NOTE — TELEPHONE ENCOUNTER
Called and left message for patient to return a call to [phone] and schedule 6 month follow up with provider (Mariana Hanson). Please schedule upon return call. Thank you.

## 2024-12-06 ENCOUNTER — TELEPHONE (OUTPATIENT)
Age: 39
End: 2024-12-06

## 2024-12-06 NOTE — TELEPHONE ENCOUNTER
Patient contacted the office to schedule a follow up visit with provider. Patient is now scheduled for 5/6  at 10:30 virtually.

## 2025-02-24 ENCOUNTER — TELEPHONE (OUTPATIENT)
Age: 40
End: 2025-02-24

## 2025-02-24 NOTE — TELEPHONE ENCOUNTER
Radha Wang and/or patient requested a call back to discuss panic attacks patient has been experiencing. Patient stated she is having more panic attacks that are lasting more than an hour. The most recent one had lasted over 4 hours.   Patient would like to know if medication increase in Klonopin would be an option?  Patient would like to discuss    They can be reached at P# [phone].       Thank you.

## 2025-02-24 NOTE — TELEPHONE ENCOUNTER
Pt called in to add on what they called in before about. Only having panic attacks on weekends when they aren't going anywhere. They seem to come out of no where

## 2025-02-26 DIAGNOSIS — F41.1 GAD (GENERALIZED ANXIETY DISORDER): ICD-10-CM

## 2025-02-26 RX ORDER — CLONAZEPAM 1 MG/1
1 TABLET ORAL 2 TIMES DAILY PRN
Qty: 60 TABLET | Refills: 0 | Status: SHIPPED | OUTPATIENT
Start: 2025-02-26

## 2025-02-26 NOTE — TELEPHONE ENCOUNTER
Called Radha [phone] and left  requesting a call back. Upon return call will tell her either her citalopram can be increased daily to help with anxiety or Dr. Hanson can add a PRN dose of klonopin daily for panic attacks.

## 2025-02-26 NOTE — TELEPHONE ENCOUNTER
Radha returned phone call.  Informed her that provider is giving her the option to either increase her citalopram or to add a PRN dose of klonopin during the day.  Radha reports that she has had her anxiety under control however on the weekends she has been getting panic attacks that she cannot control.  She believes adding the PRN dose of Klonopin would be better for now.  She is requesting a call back when script is sent since she uses [facility].    Will refer to Dr Hanson for review.

## 2025-02-26 NOTE — TELEPHONE ENCOUNTER
Called Radha and informed her that provider sent script to pharmacy for Klonopin 1 MG tablet, 1 tablet BID PRN.  Nothing further needed at this time.

## 2025-04-24 ENCOUNTER — ANNUAL EXAM (OUTPATIENT)
Dept: GYNECOLOGY | Facility: CLINIC | Age: 40
End: 2025-04-24
Payer: COMMERCIAL

## 2025-04-24 VITALS — SYSTOLIC BLOOD PRESSURE: 138 MMHG | BODY MASS INDEX: 24.74 KG/M2 | WEIGHT: 143 LBS | DIASTOLIC BLOOD PRESSURE: 74 MMHG

## 2025-04-24 DIAGNOSIS — N92.6 IRREGULAR MENSES: ICD-10-CM

## 2025-04-24 DIAGNOSIS — Z01.419 ENCOUNTER FOR ANNUAL ROUTINE GYNECOLOGICAL EXAMINATION: Primary | ICD-10-CM

## 2025-04-24 PROCEDURE — S0612 ANNUAL GYNECOLOGICAL EXAMINA: HCPCS | Performed by: OBSTETRICS & GYNECOLOGY

## 2025-04-24 NOTE — PROGRESS NOTES
Name: Radha Wang      : 1985      MRN: [id number]  Encounter Provider: Camila Hernandez DO  Encounter Date: 2025   Encounter department: [facility]  :  Assessment & Plan  Encounter for annual routine gynecological examination         Irregular menses         pap is up to date    Discussed self breast exams    Prolonged spotting with menses-this has been more frequent, occurring every month now.  We discussed sonohysterogram to rule out an endometrial polyp.  She is agreeable and will schedule this.    discussed preventive care, regular exercise and a healthy diet      History of Present Illness   HPI  Radha Wang is a 39 y.o. female who presents for yearly.  She continues to have spotting before her menstrual cycle.  She spots for a few days then has no bleeding for couple days and then her menstrual cycle starts.  The total amount of bleeding is about 10 days.  3 or 4 days are heavy.  Last year, she was having some pain on the left side but this has resolved.    Normal Pap, negative HPV in 2023      Review of Systems   Constitutional: Negative.    Gastrointestinal: Negative.    Genitourinary:  Positive for menstrual problem.          Objective   /74 (BP Location: Left arm, Patient Position: Sitting)   Wt 64.9 kg (143 lb)   LMP 04/10/2025   BMI 24.74 kg/m²      Physical Exam  Vitals and nursing note reviewed. Exam conducted with a chaperone present.   Constitutional:       Appearance: She is well-developed.   HENT:      Head: Normocephalic and atraumatic.   Neck:      Thyroid: No thyromegaly.   Cardiovascular:      Rate and Rhythm: Normal rate and regular rhythm.   Pulmonary:      Effort: Pulmonary effort is normal.      Breath sounds: Normal breath sounds.   Chest:   Breasts:     Right: Normal.      Left: Normal.      Comments: Examined seated and supine  Abdominal:      Palpations: Abdomen is soft.   Genitourinary:     General: Normal vulva.       Vagina: Normal.      Cervix: Normal.      Uterus: Normal.       Adnexa: Right adnexa normal and left adnexa normal.   Musculoskeletal:      Cervical back: Neck supple.   Skin:     General: Skin is warm and dry.   Neurological:      Mental Status: She is alert.   Psychiatric:         Mood and Affect: Mood normal.

## 2025-04-25 ENCOUNTER — TELEPHONE (OUTPATIENT)
Dept: GYNECOLOGY | Facility: CLINIC | Age: 40
End: 2025-04-25

## 2025-04-25 NOTE — TELEPHONE ENCOUNTER
Called patient and left message regarding SIS/EMB with date May 16 at 8am. Also sent [employer] message with instructions and asked patient to refer to [employer] message and respond there if she can't make it so we can send additional options.     ----- Message from Miriam LUNDY sent at 4/24/2025  4:25 PM EDT -----  Regarding: SIS  Please call patient and schedule SIS with possible EMB

## 2025-05-06 ENCOUNTER — TELEMEDICINE (OUTPATIENT)
Dept: PSYCHIATRY | Facility: CLINIC | Age: 40
End: 2025-05-06
Payer: COMMERCIAL

## 2025-05-06 ENCOUNTER — TELEPHONE (OUTPATIENT)
Dept: PSYCHIATRY | Facility: CLINIC | Age: 40
End: 2025-05-06

## 2025-05-06 DIAGNOSIS — F33.42 MDD (MAJOR DEPRESSIVE DISORDER), RECURRENT, IN FULL REMISSION (HCC): ICD-10-CM

## 2025-05-06 DIAGNOSIS — F41.1 GAD (GENERALIZED ANXIETY DISORDER): ICD-10-CM

## 2025-05-06 DIAGNOSIS — F33.2 MDD (MAJOR DEPRESSIVE DISORDER), RECURRENT SEVERE, WITHOUT PSYCHOSIS (HCC): Primary | ICD-10-CM

## 2025-05-06 PROCEDURE — 99214 OFFICE O/P EST MOD 30 MIN: CPT | Performed by: PSYCHIATRY & NEUROLOGY

## 2025-05-06 RX ORDER — CITALOPRAM HYDROBROMIDE 20 MG/1
20 TABLET ORAL DAILY
Qty: 90 TABLET | Refills: 1 | Status: SHIPPED | OUTPATIENT
Start: 2025-05-06

## 2025-05-06 NOTE — TELEPHONE ENCOUNTER
Called and left message for patient to return a call to [phone] and schedule 6 month follow up with provider (Mariana Hanson). Please schedule upon return call. Thank you.

## 2025-05-06 NOTE — BH TREATMENT PLAN
TREATMENT PLAN (Medication Management Only)        [facility]    Name and Date of Birth:  Radha Wang 39 y.o. 1985  MRN: [id number]  Date of Treatment Plan: May 6, 2025  Diagnosis/Diagnoses:    1. MDD (major depressive disorder), recurrent severe, without psychosis (HCC)    2. SHAAR (generalized anxiety disorder)    3. MDD (major depressive disorder), recurrent, in full remission (HCC)      Strengths/Personal Resources for Self-Care: taking medications as prescribed, ability to communicate needs.  Area/Areas of need (in own words): depression, depressive symptoms  1. Long Term Goal:   continue improvement in depression.  Target Date:6 months - 11/6/2025  Person/Persons responsible for completion of goal: Cherish  2.  Short Term Objective (s) - How will we reach this goal?:   A.  Provider new recommended medication/dosage changes and/or continue medication(s): continue current medications as prescribed.  B.  N/A.  C.  N/A.  Target Date:6 months - 11/6/2025  Person/Persons Responsible for Completion of Goal: Cherish  Progress Towards Goals: continuing treatment  Treatment Modality: medication management every 6 months  Review due 180 days from date of this plan: 6 months - 11/6/2025  Expected length of service: ongoing treatment unless revised  My Physician/PA/NP and I have developed this plan together and I agree to work on the goals and objectives. I understand the treatment goals that were developed for my treatment.   Electronic Signatures: on file (unless signed below)    Mariana Rangel MD 05/06/25

## 2025-05-06 NOTE — PSYCH
Virtual Regular Visit    Verification of patient location:    Patient is located in the following state in which I hold an active license [place]      Assessment/Plan:    Problem List Items Addressed This Visit          Behavioral Health    SHARA (generalized anxiety disorder)    Relevant Medications    citalopram (CeleXA) 20 mg tablet    MDD (major depressive disorder), recurrent severe, without psychosis (HCC) - Primary    Relevant Medications    citalopram (CeleXA) 20 mg tablet     Other Visit Diagnoses         MDD (major depressive disorder), recurrent, in full remission (HCC)        Relevant Medications    citalopram (CeleXA) 20 mg tablet                        Depression Screening and Follow-up Plan: Patient was screened for depression during today's encounter. They screened negative with a PHQ-9 score of 4.        PHQ-2/9 Depression Screening    Little interest or pleasure in doing things: 0 - not at all  Feeling down, depressed, or hopeless: 1 - several days  Trouble falling or staying asleep, or sleeping too much: 2 - more than half the days  Feeling tired or having little energy: 1 - several days  Poor appetite or overeatin - not at all  Feeling bad about yourself - or that you are a failure or have let yourself or your family down: 0 - not at all  Trouble concentrating on things, such as reading the newspaper or watching television: 0 - not at all  Moving or speaking so slowly that other people could have noticed. Or the opposite - being so fidgety or restless that you have been moving around a lot more than usual: 0 - not at all  Thoughts that you would be better off dead, or of hurting yourself in some way: 0 - not at all  PHQ-9 Score: 4  PHQ-9 Interpretation: No or Minimal depression         Reason for visit is   No chief complaint on file.       Encounter provider Mariana Rangel MD    Provider located at [facility] [address]  [place] [id number] [phone]      Recent Visits  No visits were found meeting these conditions.  Showing recent visits within past 7 days and meeting all other requirements  Today's Visits  Date Type Provider Dept   05/06/25 Telemedicine Mariana Rangel MD [facility]   Showing today's visits and meeting all other requirements  Future Appointments  No visits were found meeting these conditions.  Showing future appointments within next 150 days and meeting all other requirements       The patient was identified by name and date of birth. Radha Wang was informed that this is a telemedicine visit and that the visit is being conducted throughthe Epic Embedded platform. She agrees to proceed..  My office door was closed. No one else was in the room.  She acknowledged consent and understanding of privacy and security of the video platform. The patient has agreed to participate and understands they can discontinue the visit at any time.    Patient is aware this is a billable service.     Subjective  Radha Wang is a 39 y.o. female with MDD and SHARA .Patient remains compliant with medications and denies side effects . She denies recent health changes or new medications. She is reporting stable mood.  She stated she had workup negative for rheumatoid arthritis. She stated she has OA and will be consulting neurologist to discuss tremors, and balance issues.     Recently she tried reducing the Citalopram to 10 mg but she noticed she was feeling irritable and agitated and she decided to resume 20 mg. She stated that the neurologist suggested stopping Citalopram since they couldn't determine a cause for her tremors, but that approach did not helped.    She stated her tremors seemed to have improved but it is still present.   Fasting labs from June 2024.Will repeat labs in June 2025.  Elevated cholesterol and low Vit D.  Will repeat labs and has order from PCP.  Reported  increased anxiety and recent episode of panic, Clonazepam helps manage symptoms.    Since last seen she started therapy to help manage anxiety and depression.They meet every 2 weeks.     She agrees to continue current treatment as prescribed. Will schedule follow up in 6 months.         HPI     Past Medical History:   Diagnosis Date    Allergic 2000    Seasonal    Anxiety 2010    Depression 2010    Eating disorder 2005    In the past not current    Fractures 2016    finger    GERD (gastroesophageal reflux disease) 2013    Head injury 2010    concussions    Headache(784.0) 1995    Migraine 1994    Panic attack 2009    Sleep difficulties 2020    Hard to stay asleep    Stomach disorder 2009    IBS    Urinary tract infection     Varicella        Past Surgical History:   Procedure Laterality Date    COLONOSCOPY      DENTAL SURGERY  2015    wisdom teeth    ESOPHAGOGASTRODUODENOSCOPY  02/17/2012    Diagnostic; Dr. Martínez, chronic gastritis    LUMBAR EPIDURAL INJECTION  05/28/2021    SKIN BIOPSY      TOOTH EXTRACTION         Current Outpatient Medications   Medication Sig Dispense Refill    citalopram (CeleXA) 20 mg tablet Take 1 tablet (20 mg total) by mouth daily 90 tablet 1    clonazePAM (KlonoPIN) 1 mg tablet Take 1 tablet (1 mg total) by mouth 2 (two) times a day as needed for seizures 60 tablet 0     No current facility-administered medications for this visit.        Allergies   Allergen Reactions    Doxycycline GI Intolerance       Review of Systems     Mood Anxiety and Depression   Behavior Normal    Thought Content Disturbing Thoughts, Feelings   General Emotional Problems and Decreased Functioning   Personality Normal   Other Psych Symptoms Normal   Constitutional Negative   ENT Negative   Cardiovascular Negative   Respiratory Negative   Gastrointestinal Negative   Genitourinary Negative   Musculoskeletal Negative   Integumentary Negative   Neurological Negative   Endocrine Normal    Other Symptoms Normal         Laboratory Results:   Recent Labs (last 12 months):   Appointment on 06/28/2024   Component Date Value    Ferritin 06/28/2024 18     Sodium 06/28/2024 140     Potassium 06/28/2024 4.7     Chloride 06/28/2024 103     CO2 06/28/2024 26     ANION GAP 06/28/2024 11     BUN 06/28/2024 12     Creatinine 06/28/2024 0.64     Glucose, Fasting 06/28/2024 77     Calcium 06/28/2024 9.5     AST 06/28/2024 24     ALT 06/28/2024 20     Alkaline Phosphatase 06/28/2024 45     Total Protein 06/28/2024 6.8     Albumin 06/28/2024 4.3     Total Bilirubin 06/28/2024 0.49     eGFR 06/28/2024 115     WBC 06/28/2024 4.65     RBC 06/28/2024 3.98     Hemoglobin 06/28/2024 12.2     Hematocrit 06/28/2024 38.5     MCV 06/28/2024 97     MCH 06/28/2024 30.7     MCHC 06/28/2024 31.7     RDW 06/28/2024 12.6     Platelets 06/28/2024 214     MPV 06/28/2024 10.4     TSH 3RD GENERATON 06/28/2024 1.606     Hemoglobin A1C 06/28/2024 4.7     EAG 06/28/2024 88     Cholesterol 06/28/2024 239 (H)     Triglycerides 06/28/2024 91     HDL, Direct 06/28/2024 141     LDL Calculated 06/28/2024 80     Non-HDL-Chol (CHOL-HDL) 06/28/2024 98     Color, UA 06/28/2024 Light Yellow     Clarity, UA 06/28/2024 Clear     Specific Gravity, UA 06/28/2024 1.019     pH, UA 06/28/2024 6.0     Leukocytes, UA 06/28/2024 Negative     Nitrite, UA 06/28/2024 Negative     Protein, UA 06/28/2024 Trace (A)     Glucose, UA 06/28/2024 Negative     Ketones, UA 06/28/2024 Negative     Urobilinogen, UA 06/28/2024 <2.0     Bilirubin, UA 06/28/2024 Negative     Occult Blood, UA 06/28/2024 Trace (A)     RBC, UA 06/28/2024 1-2     WBC, UA 06/28/2024 2-4 (A)     Epithelial Cells 06/28/2024 Occasional     Bacteria, UA 06/28/2024 Occasional     MUCUS THREADS 06/28/2024 Occasional (A)     Vitamin B-12 06/28/2024 200     Vit D, 25-Hydroxy 06/28/2024 26.2 (L)        Substance Abuse History:  Social History     Substance and Sexual Activity   Drug Use No       Family Psychiatric History:   Family  History   Problem Relation Age of Onset    Hypothyroidism Mother     Thyroid disease Mother     Alcohol abuse Mother     Anxiety disorder Mother     Depression Mother     Other Maternal Grandmother         Thyroid disorder    Diabetes Maternal Grandmother     Colon cancer Paternal Grandmother     Other Paternal Grandmother         Thyroid disorder    Cancer Paternal Grandmother     Colon cancer Paternal Grandfather     Other Paternal Grandfather         Thyroid disorder    Other Maternal Aunt         Thyroid disorder    Other Paternal Aunt         Thyroid disorder    Other Maternal Uncle         Thyroid disorder    Colon cancer Paternal Uncle     Crohn's disease Paternal Uncle     Other Paternal Uncle         Thyroid disorder    Other Family         Benign polyps of the large intestine    No Known Problems Father        The following portions of the patient's history were reviewed and updated as appropriate: allergies, current medications, past family history, past medical history, past social history, past surgical history and problem list.    Social History     Socioeconomic History    Marital status: /Civil Union     Spouse name: Not on file    Number of children: Not on file    Years of education: Not on file    Highest education level: Not on file   Occupational History    Not on file   Tobacco Use    Smoking status: Former     Current packs/day: 0.00     Types: Cigarettes     Quit date: 2011     Years since quittin.9    Smokeless tobacco: Never   Vaping Use    Vaping status: Never Used   Substance and Sexual Activity    Alcohol use: Yes     Comment: Social drinker    Drug use: No    Sexual activity: Yes     Partners: Female   Other Topics Concern    Not on file   Social History Narrative    Caffeine use    Daily coffee consumption (1 cup/day)    Denied:  History of     Mormon affiliation:  None    Uses safety equipment - seatbelts     Social Drivers of Health     Financial Resource  Strain: Not on file   Food Insecurity: Not on file   Transportation Needs: Not on file   Physical Activity: Not on file   Stress: Not on file   Social Connections: Not on file   Intimate Partner Violence: Not on file   Housing Stability: Not on file     Social History     Social History Narrative    Caffeine use    Daily coffee consumption (1 cup/day)    Denied:  History of     Spiritism affiliation:  None    Uses safety equipment - seatbelts       Objective:       Mental status:  Appearance calm and cooperative , adequate hygiene and grooming and good eye contact    Mood dysphoric   Affect affect was constricted   Speech a normal rate and fluent   Thought Processes coherent/organized and normal thought processes   Hallucinations no hallucinations present    Thought Content no delusions   Abnormal Thoughts no suicidal thoughts  and no homicidal thoughts    Orientation  oriented to person and place and time   Remote Memory short term memory intact and long term memory intact   Attention Span concentration intact   Intellect Appears to be of Average Intelligence   Insight Limited insight   Judgement judgment was limited   Muscle Strength Muscle strength and tone were normal and Normal gait    Language no difficulty naming common objects and no difficulty repeating a phrase    Fund of Knowledge displays adequate knowledge of current events, adequate fund of knowledge regarding past history and adequate fund of knowledge regarding vocabulary                Assessment/Plan:       Diagnoses and all orders for this visit:    MDD (major depressive disorder), recurrent severe, without psychosis (HCC)    SHARA (generalized anxiety disorder)    MDD (major depressive disorder), recurrent, in full remission (HCC)  -     citalopram (CeleXA) 20 mg tablet; Take 1 tablet (20 mg total) by mouth daily              Assessment & Plan  MDD (major depressive disorder), recurrent severe, without psychosis (HCC)         SHARA  (generalized anxiety disorder)         MDD (major depressive disorder), recurrent, in full remission ([facility])    Orders:    citalopram (CeleXA) 20 mg tablet; Take 1 tablet (20 mg total) by mouth daily           Treatment Recommendations- Risks Benefits      Immediate Medical/Psychiatric/Psychotherapy Treatments and Any Precautions: continue current treatment     Risks, Benefits And Possible Side Effects Of Medications:  {PSYCH RISK, BENEFITS AND POSSIBLE SIDE EFFECTS (Optional):31441    Controlled Medication Discussion: Discussed with patient Black Box warning on concurrent use of benzodiazepines and opioid medications including sedation, respiratory depression, coma and death. Patient understands the risk of treatment with benzodiazepines in addition to opioids and wants to continue taking those medications. , Discussed with patient the risks of sedation, respiratory depression, impairment of ability to drive and potential for abuse and addiction related to treatment with benzodiazepine medications. The patient understands risk of treatment with benzodiazepine medications, agrees to not drive if feels impaired and agrees to take medications as prescribed. and The patient has been filling controlled prescriptions on time as prescribed to [place] Prescription Drug Monitoring program.      Psychotherapy Provided: No          The Patient is located at Home and in the following state in which I hold an active license [place].    The patient was identified by name and date of birth. Patient  was informed that this is a telemedicine visit and that the visit is being conducted through the Epic Embedded platform. She agrees to proceed..  My office door was closed. No one else was in the room.  She acknowledged consent and understanding of privacy and security of the video platform. The patient has agreed to participate and understands they can discontinue the visit at any time.    I have spent a total time of 30 minutes in  caring for this patient on the day of the visit/encounter including Prognosis, Risks and benefits of tx options, Instructions for management, Patient and family education, Importance of tx compliance, Risk factor reductions, Impressions, Documenting in the medical record, Reviewing/placing orders in the medical record (including tests, medications, and/or procedures), and Obtaining or reviewing history  , not including the time spent for establishing the audio/video connection.              Visit Time    Visit Start Time: 10:30  Visit Stop Time: 11:00  Total Visit Duration:  30 minutes

## 2025-05-13 DIAGNOSIS — F41.1 GAD (GENERALIZED ANXIETY DISORDER): ICD-10-CM

## 2025-05-14 RX ORDER — CLONAZEPAM 1 MG/1
TABLET ORAL
Qty: 60 TABLET | Refills: 0 | Status: SHIPPED | OUTPATIENT
Start: 2025-05-14

## 2025-06-19 ENCOUNTER — TRANSCRIBE ORDERS (OUTPATIENT)
Dept: ADMINISTRATIVE | Age: 40
End: 2025-06-19

## 2025-06-19 ENCOUNTER — APPOINTMENT (OUTPATIENT)
Dept: LAB | Age: 40
End: 2025-06-19
Attending: FAMILY MEDICINE
Payer: COMMERCIAL

## 2025-06-19 DIAGNOSIS — R94.6 NONSPECIFIC ABNORMAL RESULTS OF THYROID FUNCTION STUDY: ICD-10-CM

## 2025-06-19 DIAGNOSIS — R82.90 NONSPECIFIC FINDING ON EXAMINATION OF URINE: ICD-10-CM

## 2025-06-19 DIAGNOSIS — E61.1 IRON DEFICIENCY: Primary | ICD-10-CM

## 2025-06-19 DIAGNOSIS — E55.9 VITAMIN D DEFICIENCY: ICD-10-CM

## 2025-06-19 DIAGNOSIS — D51.9 ANEMIA DUE TO VITAMIN B12 DEFICIENCY, UNSPECIFIED B12 DEFICIENCY TYPE: ICD-10-CM

## 2025-06-19 DIAGNOSIS — E78.5 HYPERLIPIDEMIA, UNSPECIFIED HYPERLIPIDEMIA TYPE: ICD-10-CM

## 2025-06-19 DIAGNOSIS — R73.09 IMPAIRED GLUCOSE TOLERANCE TEST: ICD-10-CM

## 2025-06-19 DIAGNOSIS — E61.1 IRON DEFICIENCY: ICD-10-CM

## 2025-06-19 LAB
25(OH)D3 SERPL-MCNC: 15.3 NG/ML (ref 30–100)
ALBUMIN SERPL BCG-MCNC: 4.5 G/DL (ref 3.5–5)
ALP SERPL-CCNC: 50 U/L (ref 34–104)
ALT SERPL W P-5'-P-CCNC: 23 U/L (ref 7–52)
ANION GAP SERPL CALCULATED.3IONS-SCNC: 12 MMOL/L (ref 4–13)
AST SERPL W P-5'-P-CCNC: 29 U/L (ref 13–39)
BACTERIA UR QL AUTO: ABNORMAL /HPF
BILIRUB SERPL-MCNC: 0.52 MG/DL (ref 0.2–1)
BILIRUB UR QL STRIP: NEGATIVE
BUN SERPL-MCNC: 6 MG/DL (ref 5–25)
CALCIUM SERPL-MCNC: 9.3 MG/DL (ref 8.4–10.2)
CHLORIDE SERPL-SCNC: 102 MMOL/L (ref 96–108)
CHOLEST SERPL-MCNC: 245 MG/DL (ref ?–200)
CLARITY UR: ABNORMAL
CO2 SERPL-SCNC: 27 MMOL/L (ref 21–32)
COLOR UR: ABNORMAL
CREAT SERPL-MCNC: 0.57 MG/DL (ref 0.6–1.3)
EST. AVERAGE GLUCOSE BLD GHB EST-MCNC: 94 MG/DL
FERRITIN SERPL-MCNC: 15 NG/ML (ref 30–307)
GFR SERPL CREATININE-BSD FRML MDRD: 118 ML/MIN/1.73SQ M
GLUCOSE P FAST SERPL-MCNC: 79 MG/DL (ref 65–99)
GLUCOSE UR STRIP-MCNC: NEGATIVE MG/DL
HBA1C MFR BLD: 4.9 %
HDLC SERPL-MCNC: 131 MG/DL
HGB UR QL STRIP.AUTO: NEGATIVE
KETONES UR STRIP-MCNC: NEGATIVE MG/DL
LDLC SERPL CALC-MCNC: 88 MG/DL (ref 0–100)
LEUKOCYTE ESTERASE UR QL STRIP: NEGATIVE
NITRITE UR QL STRIP: NEGATIVE
NON-SQ EPI CELLS URNS QL MICRO: ABNORMAL /HPF
NONHDLC SERPL-MCNC: 114 MG/DL
PH UR STRIP.AUTO: 7 [PH]
POTASSIUM SERPL-SCNC: 4.6 MMOL/L (ref 3.5–5.3)
PROT SERPL-MCNC: 7 G/DL (ref 6.4–8.4)
PROT UR STRIP-MCNC: NEGATIVE MG/DL
RBC #/AREA URNS AUTO: ABNORMAL /HPF
SODIUM SERPL-SCNC: 141 MMOL/L (ref 135–147)
SP GR UR STRIP.AUTO: 1.01 (ref 1–1.03)
TRIGL SERPL-MCNC: 129 MG/DL (ref ?–150)
TSH SERPL DL<=0.05 MIU/L-ACNC: 1.62 UIU/ML (ref 0.45–4.5)
UROBILINOGEN UR STRIP-ACNC: <2 MG/DL
VIT B12 SERPL-MCNC: 178 PG/ML (ref 180–914)
WBC #/AREA URNS AUTO: ABNORMAL /HPF

## 2025-06-19 PROCEDURE — 80053 COMPREHEN METABOLIC PANEL: CPT

## 2025-06-19 PROCEDURE — 84443 ASSAY THYROID STIM HORMONE: CPT

## 2025-06-19 PROCEDURE — 83036 HEMOGLOBIN GLYCOSYLATED A1C: CPT

## 2025-06-19 PROCEDURE — 81001 URINALYSIS AUTO W/SCOPE: CPT

## 2025-06-19 PROCEDURE — 82607 VITAMIN B-12: CPT

## 2025-06-19 PROCEDURE — 82728 ASSAY OF FERRITIN: CPT

## 2025-06-19 PROCEDURE — 36415 COLL VENOUS BLD VENIPUNCTURE: CPT

## 2025-06-19 PROCEDURE — 82306 VITAMIN D 25 HYDROXY: CPT

## 2025-06-19 PROCEDURE — 80061 LIPID PANEL: CPT

## 2025-07-10 ENCOUNTER — PROCEDURE VISIT (OUTPATIENT)
Dept: GYNECOLOGY | Facility: CLINIC | Age: 40
End: 2025-07-10
Payer: COMMERCIAL

## 2025-07-10 ENCOUNTER — ULTRASOUND (OUTPATIENT)
Dept: GYNECOLOGY | Facility: CLINIC | Age: 40
End: 2025-07-10
Payer: COMMERCIAL

## 2025-07-10 DIAGNOSIS — N93.9 ABNORMAL UTERINE BLEEDING (AUB): Primary | ICD-10-CM

## 2025-07-10 PROCEDURE — 88305 TISSUE EXAM BY PATHOLOGIST: CPT | Performed by: PATHOLOGY

## 2025-07-10 PROCEDURE — 99213 OFFICE O/P EST LOW 20 MIN: CPT | Performed by: OBSTETRICS & GYNECOLOGY

## 2025-07-10 NOTE — PROGRESS NOTES
Name: Radha Wang      : 1985      MRN: [id number]  Encounter Provider: Camila Hernandez DO  Encounter Date: 7/10/2025   [facility]  :  Assessment & Plan  Abnormal uterine bleeding (AUB)         Prolonged spotting-will await the results of her endometrial biopsy.  If normal, she can choose observation versus a low-dose birth control pill.  She was reassured that the sonohysterogram is normal.    History of Present Illness   HPI  Radha Wang is a 39 y.o. female who presents for SIS for evaluation of prolonged spotting with her menstrual cycles.  This has been happening every month.    Sonohysterogram shows a smooth endometrium with no filling defect.  Ovaries are normal.  Biopsy done      Review of Systems   Constitutional: Negative.    Gastrointestinal: Negative.    Genitourinary:  Positive for menstrual problem.          Objective   There were no vitals taken for this visit.     Physical Exam  Genitourinary:     General: Normal vulva.      Vagina: Normal.      Cervix: Normal.      Uterus: Normal.       Adnexa: Right adnexa normal and left adnexa normal.      Comments: Uterus sounded to 6 cm

## 2025-07-10 NOTE — PROGRESS NOTES
"AMB US Pelvic Non OB    Date/Time: 7/10/2025 2:30 PM    Performed by: Chiara Ruiz  Authorized by: Camila Hernandez DO    Universal Protocol:  Consent: Verbal consent obtained  Consent given by: patient  Time out: Immediately prior to procedure a \"time out\" was called to verify the correct patient, procedure, equipment, support staff and site/side marked as required.  Timeout called at: 7/10/2025 2:28 PM.  Patient understanding: patient states understanding of the procedure being performed  Patient identity confirmed: verbally with patient    Procedure details:     SIS Procedure: Yes    Indications: non-obstetric vaginal bleeding      Technique:  Transvaginal US, Non-OB    Position: lithotomy exam    Uterine findings:     Length (cm): 6.78    Height (cm):  3.31    Width (cm):  3.87    Endometrial stripe: identified      Endometrium thickness (mm):  5.5  Left ovary findings:     Left ovary:  Visualized    Length (cm): 3.81    Height (cm): 1.92    Width (cm): 2.38  Right ovary findings:     Right ovary:  Visualized    Length (cm): 3.2    Height (cm): 2.1    Width (cm): 2.27  Other findings:     Free pelvic fluid: not identified      Free peritoneal fluid: not identified    Post-Procedure Details:     Impression:  Anteverted uterus and bilateral ovaries appear within normal limits. No free fluid.     Tolerance:  Tolerated well, no immediate complications    Complications: no complications    Additional Procedure Comments:      [employer] F8 E8C-RS transvaginal transducer Serial # [id number] was used to perform the examination today and subsequently followed with high level disinfection utilizing Trophon EPR procedure.     Ultrasound performed at:     [facility]  [address] [place] [id number]  Phone:  [phone]  Fax:  [phone]    Sonohysterogram    Date/Time: 7/10/2025 2:30 PM    Performed by: Camila Hernandez DO  Authorized by: Camila Hernandez DO    Universal Protocol:  Consent: Verbal consent " "obtained  Consent given by: patient  Time out: Immediately prior to procedure a \"time out\" was called to verify the correct patient, procedure, equipment, support staff and site/side marked as required.  Timeout called at: 7/10/2025 2:28 PM.  Patient understanding: patient states understanding of the procedure being performed  Patient identity confirmed: verbally with patient    Procedure:     Cervix cleaned and prepped: yes      Tenaculum applied to cervix: yes      Uterus sounded: yes      Catheter inserted: yes      Uterine cavity distended with saline: yes    Post-procedure:     Patient observed: yes      Post procedure instructions given to patient: yes      Patient tolerated procedure well with no complications: yes    Comments:      Sonohysterogram demonstrates a smooth appearing endometrium.   Endometrial biopsy    Date/Time: 7/10/2025 2:30 PM    Performed by: Camila Hernandez DO  Authorized by: Camila Hernandez DO    Universal Protocol:  Consent: Verbal consent obtained  Consent given by: patient  Time out: Immediately prior to procedure a \"time out\" was called to verify the correct patient, procedure, equipment, support staff and site/side marked as required.  Timeout called at: 7/10/2025 2:28 PM.  Patient understanding: patient states understanding of the procedure being performed  Patient identity confirmed: verbally with patient        "

## 2025-07-16 ENCOUNTER — RESULTS FOLLOW-UP (OUTPATIENT)
Dept: GYNECOLOGY | Facility: CLINIC | Age: 40
End: 2025-07-16

## 2025-07-16 PROCEDURE — 88305 TISSUE EXAM BY PATHOLOGIST: CPT | Performed by: PATHOLOGY
